# Patient Record
Sex: FEMALE | URBAN - METROPOLITAN AREA
[De-identification: names, ages, dates, MRNs, and addresses within clinical notes are randomized per-mention and may not be internally consistent; named-entity substitution may affect disease eponyms.]

---

## 2022-08-17 ENCOUNTER — TELEPHONE (OUTPATIENT)
Dept: GYNECOLOGIC ONCOLOGY | Facility: CLINIC | Age: 57
End: 2022-08-17

## 2022-08-17 DIAGNOSIS — N84.2 VAGINAL POLYP: Primary | ICD-10-CM

## 2022-08-17 NOTE — TELEPHONE ENCOUNTER
Patient scheduled for 9/13 with Dr January Painting  Patient is aware of date, time, and location of appointment  Patient requests Dr January Painting specifically, as her mother was a former patient  Appointment within 10 days is not necessary

## 2022-09-13 ENCOUNTER — CONSULT (OUTPATIENT)
Dept: GYNECOLOGIC ONCOLOGY | Facility: CLINIC | Age: 57
End: 2022-09-13
Payer: MEDICARE

## 2022-09-13 VITALS
RESPIRATION RATE: 17 BRPM | SYSTOLIC BLOOD PRESSURE: 130 MMHG | DIASTOLIC BLOOD PRESSURE: 86 MMHG | OXYGEN SATURATION: 98 % | TEMPERATURE: 98.2 F | HEART RATE: 97 BPM

## 2022-09-13 DIAGNOSIS — N95.0 PMB (POSTMENOPAUSAL BLEEDING): Primary | ICD-10-CM

## 2022-09-13 DIAGNOSIS — E66.01 MORBID OBESITY WITH BMI OF 45.0-49.9, ADULT (HCC): ICD-10-CM

## 2022-09-13 DIAGNOSIS — K43.9 VENTRAL HERNIA WITHOUT OBSTRUCTION OR GANGRENE: ICD-10-CM

## 2022-09-13 PROBLEM — E03.9 ACQUIRED HYPOTHYROIDISM: Status: ACTIVE | Noted: 2022-09-13

## 2022-09-13 PROBLEM — J95.03 TRACHEAL STENOSIS DUE TO TRACHEOSTOMY (HCC): Status: ACTIVE | Noted: 2022-09-13

## 2022-09-13 PROCEDURE — 58100 BIOPSY OF UTERUS LINING: CPT | Performed by: OBSTETRICS & GYNECOLOGY

## 2022-09-13 PROCEDURE — 88305 TISSUE EXAM BY PATHOLOGIST: CPT | Performed by: STUDENT IN AN ORGANIZED HEALTH CARE EDUCATION/TRAINING PROGRAM

## 2022-09-13 PROCEDURE — 99205 OFFICE O/P NEW HI 60 MIN: CPT | Performed by: OBSTETRICS & GYNECOLOGY

## 2022-09-13 RX ORDER — MEGESTROL ACETATE 40 MG/1
TABLET ORAL
Qty: 120 TABLET | Refills: 3 | Status: SHIPPED | OUTPATIENT
Start: 2022-09-13

## 2022-09-13 RX ORDER — ACETAMINOPHEN 325 MG/1
975 TABLET ORAL ONCE
Status: CANCELLED | OUTPATIENT
Start: 2022-09-13 | End: 2022-09-13

## 2022-09-13 RX ORDER — LIOTHYRONINE SODIUM 5 UG/1
5 TABLET ORAL DAILY
COMMUNITY

## 2022-09-13 RX ORDER — HEPARIN SODIUM 5000 [USP'U]/ML
5000 INJECTION, SOLUTION INTRAVENOUS; SUBCUTANEOUS
Status: CANCELLED | OUTPATIENT
Start: 2022-09-14 | End: 2022-09-15

## 2022-09-13 RX ORDER — GABAPENTIN 100 MG/1
200 CAPSULE ORAL ONCE
Status: CANCELLED | OUTPATIENT
Start: 2022-09-13 | End: 2022-09-13

## 2022-09-13 RX ORDER — LEVOTHYROXINE SODIUM 0.1 MG/1
100 TABLET ORAL DAILY
COMMUNITY

## 2022-09-13 RX ORDER — SODIUM CHLORIDE, SODIUM LACTATE, POTASSIUM CHLORIDE, CALCIUM CHLORIDE 600; 310; 30; 20 MG/100ML; MG/100ML; MG/100ML; MG/100ML
125 INJECTION, SOLUTION INTRAVENOUS CONTINUOUS
Status: CANCELLED | OUTPATIENT
Start: 2022-09-13

## 2022-09-15 NOTE — PROGRESS NOTES
Assessment/Plan:    Problem List Items Addressed This Visit        Other    Morbid obesity with BMI of 45 0-49 9, adult ([facility] [address] ) - Primary    Relevant Medications    Semaglutide-Weight Management (WEGOVY) 0 25 MG/0 5ML    Other Relevant Orders    Tissue Exam    PMB (postmenopausal bleeding)     58-year-old with morbid obesity, BMI least 45 kilograms/meter squared, history of COVID ARDS requiring intubation, tracheostomy, peg tube placement which was complicated by gastric perforation requiring exploratory laparotomy  Midline incision healed by secondary intention  She has a large incisional hernia  She has tracheal scarring and has been recommended for short segment tracheal resection  She has postmenopausal bleeding and an atypical glandular cell Pap smear  She has a history of complex atypical hyperplasia of the endometrium diagnosed in 2016  I reviewed her CT abdomen pelvis results from July, ultrasound results  Her performance status is 2   1  I reviewed the Pap smear, ultrasound data with her  2  Will follow-up results of endometrial biopsy performed today  3  I prescribed JPFHJD for weight loss  We discussed the risks and benefits of Wegovy and she agrees to proceed  4  Megace 80 mg twice daily to palliate her postmenopausal bleeding  She will stop her norethindrone  5  I discussed the risks and benefits of possible robotic assisted total laparoscopic hysterectomy, bilateral salpingo-oophorectomy, possible lymph node dissection, possible exploratory laparotomy and all other indicated procedures  She understands that ventral hernia repair can be performed simultaneously but that mesh choices are limited due to risk of postoperative infection  She understands the risks and benefits of surgery and agrees to proceed as outlined  Consent for surgery was obtained by me in the office  6  I briefly discussed her tracheal scarring with thoracic surgery  She does not have stridor    She can be referred for short segment tracheal resection after this procedure as she would not be able to be intubated for up to 6 months after a tracheal resection  Thank you for the courtesy of this consultation  All questions were answered by the end of the visit  Relevant Medications    megestrol (MEGACE) 40 mg tablet    Other Relevant Orders    Case request operating room: HYSTERECTOMY LAPAROSCOPIC TOTAL ([address]) W/ ROBOTICS, VENTRAL HERNIA REPAIR (Completed)    Type and screen    Comprehensive metabolic panel    CBC and differential    Protime-INR    HEMOGLOBIN A1C W/ EAG ESTIMATION    EKG 12 lead    XR chest pa & lateral    Ventral hernia without obstruction or gangrene    Relevant Orders    Ambulatory referral to General Surgery    Case request operating room: HYSTERECTOMY LAPAROSCOPIC TOTAL ([address]) W/ ROBOTICS, VENTRAL HERNIA REPAIR (Completed)              CHIEF COMPLAINT:  Postmenopausal bleeding, abnormal Pap smear          Patient ID: Herb Delarosa is a 61 y o  female  58-year-old para 0 with morbid obesity referred by Dr Ferny Rust to discuss treatment options for postmenopausal bleeding, atypical glandular cells Pap smear  Her history is notable for severe COVID infection with ARDS requiring intubation, tracheostomy, peg tube placement  Peg tube placement complicated by gastric perforation which then required exploratory laparotomy and washout  Her abdominal incision healed by secondary intention  After removal of the tracheostomy, 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  The scar was partially revised, however, she was recommended for short segment tracheal resection  Family history is notable for endometrial cancer-uterine papillary serous carcinoma-in her mother  Transvaginal and pelvic ultrasound on 6/21/2022 revealed a 10 mm endometrial thickness  The uterus measured 7 2 x 3 6 cm  The ovaries were not seen    She had a CT of the abdomen pelvis in July of 2022 which did not reveal any evidence of lymphadenopathy, ascites, distant disease  She bleeds daily  She had some palliation with norethindrone  Review of Systems   Constitutional: Positive for activity change and fatigue  Negative for unexpected weight change  HENT: Negative  Eyes: Negative  Respiratory: Positive for shortness of breath  Cardiovascular: Negative  Gastrointestinal: Negative for abdominal distention and abdominal pain  Endocrine: Negative  Genitourinary: Positive for vaginal bleeding  Negative for pelvic pain  Musculoskeletal: Negative  Skin: Negative  Allergic/Immunologic: Negative  Neurological: Negative  Hematological: Negative  Psychiatric/Behavioral: The patient is nervous/anxious  Current Outpatient Medications   Medication Sig Dispense Refill    levothyroxine 100 mcg tablet Take 100 mcg by mouth daily      liothyronine (CYTOMEL) 5 mcg tablet Take 5 mcg by mouth daily      megestrol (MEGACE) 40 mg tablet 2 tablets by mouth twice daily 120 tablet 3    metoprolol tartrate (LOPRESSOR) 25 mg tablet Take 25 mg by mouth every 12 (twelve) hours      Semaglutide-Weight Management (WEGOVY) 0 25 MG/0 5ML Inject 0 5 mL (0 25 mg total) under the skin once a week for 28 days 2 mL 0     No current facility-administered medications for this visit         No Known Allergies    Past Medical History:   Diagnosis Date    Acute hypoxemic respiratory failure due to COVID-19 [facility])     Primary hypertension        Past Surgical History:   Procedure Laterality Date    CARPAL TUNNEL RELEASE      EXPLORATORY LAPAROTOMY      PEG TUBE PLACEMENT      REVISION TRACHEOSTOMY SCAR      TRACHEOSTOMY         OB History        0    Para   0    Term   0       0    AB   0    Living   0       SAB   0    IAB   0    Ectopic   0    Multiple   0    Live Births   0                 Family History   Problem Relation Age of Onset    Cancer Mother     Cancer Father        The following portions of the patient's history were reviewed and updated as appropriate: allergies, current medications, past family history, past medical history, past social history, past surgical history and problem list       Objective:    Blood pressure 130/86, pulse 97, temperature 98 2 °F (36 8 °C), temperature source Temporal, resp  rate 17, SpO2 98 %  There is no height or weight on file to calculate BMI  Physical Exam  Vitals reviewed  Exam conducted with a chaperone present  Constitutional:       General: She is not in acute distress  Appearance: Normal appearance  She is well-developed  She is obese  She is not ill-appearing, toxic-appearing or diaphoretic  HENT:      Head: Normocephalic and atraumatic  Mouth/Throat:      Mouth: Mucous membranes are moist    Eyes:      General: No scleral icterus  Right eye: No discharge  Left eye: No discharge  Extraocular Movements: Extraocular movements intact  Conjunctiva/sclera: Conjunctivae normal    Neck:      Thyroid: No thyromegaly  Cardiovascular:      Rate and Rhythm: Normal rate and regular rhythm  Heart sounds: Normal heart sounds  No murmur heard  No friction rub  No gallop  Pulmonary:      Effort: Pulmonary effort is normal  No respiratory distress  Breath sounds: Normal breath sounds  No stridor  Abdominal:      General: There is no distension  Palpations: Abdomen is soft  There is no mass  Tenderness: There is no abdominal tenderness  There is no guarding or rebound  Hernia: A hernia is present  Comments: At least 8 cm hernia present left upper quadrant adjacent to the midline incision   Genitourinary:     Comments: The external female genitalia is normal  The bartholin's, uretheral and skenes glands are normal  The urethral meatus is normal (midline with no lesions)  Anus without fissure or lesion  Speculum exam reveals vagina without lesion or discharge   Cervix is normal appearing and anterior without visible lesions  There was a mass prolapsing through the cervical os  There was a small amount of blood coming from the cervical os as well  Change in position was necessary to adequately visualize the cervix  No significant cystocele or rectocele noted  Bimanual exam notes a uterus with normal contour, mobility  No tenderness  Adnexa without masses or tenderness  Bladder is without fullness, mass or tenderness  Musculoskeletal:         General: No swelling, tenderness, deformity or signs of injury  Cervical back: Normal range of motion and neck supple  Right lower leg: Edema present  Left lower leg: Edema present  Lymphadenopathy:      Cervical: No cervical adenopathy  Skin:     General: Skin is warm and dry  Coloration: Skin is not jaundiced or pale  Findings: No bruising, erythema or rash  Neurological:      General: No focal deficit present  Mental Status: She is alert and oriented to person, place, and time  Cranial Nerves: No cranial nerve deficit  Motor: No weakness  Gait: Gait abnormal    Psychiatric:         Mood and Affect: Mood normal          Behavior: Behavior normal          Thought Content: Thought content normal          Judgment: Judgment normal          Endometrial biopsy    Date/Time: 9/15/2022 8:49 AM  Performed by: Melvina Meredith MD  Authorized by: Melvina Meredith MD   Universal Protocol:  Consent: Verbal consent obtained    Consent given by: patient  Patient understanding: patient states understanding of the procedure being performed  Patient identity confirmed: verbally with patient      Indication:     Indications: Post-menopausal bleeding      Chronicity of post-menopausal bleeding:  Recurrent    Progression of post-menopausal bleeding:  Worsening  Procedure:     Procedure: endometrial biopsy with Pipelle      A bivalve speculum was placed in the vagina: yes      Uterus sounded: yes      Uterus sound depth (cm):  8    Specimen collected: specimen collected and sent to pathology      Patient tolerated procedure well with no complications: yes    Findings:     Uterus size:  6-8 weeks    Cervix comment:  Mass prolapsing through cervix  Comments:     Procedure comments: The cervix was positioned anteriorly and slightly behind the pubic symphysis  Therefore, in order to visualize the cervix appropriately, she was placed in dorsal lithotomy position  This allowed adequate visualization of the cervix  The mass at the cervical os was removed  Endometrial biopsy was subsequently performed  Both specimens were sent together

## 2022-09-15 NOTE — ASSESSMENT & PLAN NOTE
25-year-old with morbid obesity, BMI least 45 kilograms/meter squared, history of COVID ARDS requiring intubation, tracheostomy, peg tube placement which was complicated by gastric perforation requiring exploratory laparotomy  Midline incision healed by secondary intention  She has a large incisional hernia  She has tracheal scarring and has been recommended for short segment tracheal resection  She has postmenopausal bleeding and an atypical glandular cell Pap smear  She has a history of complex atypical hyperplasia of the endometrium diagnosed in 2016  I reviewed her CT abdomen pelvis results from July, ultrasound results  Her performance status is 2   1  I reviewed the Pap smear, ultrasound data with her  2  Will follow-up results of endometrial biopsy performed today  3  I prescribed RUYDOI for weight loss  We discussed the risks and benefits of Wegovy and she agrees to proceed  4  Megace 80 mg twice daily to palliate her postmenopausal bleeding  She will stop her norethindrone  5  I discussed the risks and benefits of possible robotic assisted total laparoscopic hysterectomy, bilateral salpingo-oophorectomy, possible lymph node dissection, possible exploratory laparotomy and all other indicated procedures  She understands that ventral hernia repair can be performed simultaneously but that mesh choices are limited due to risk of postoperative infection  She understands the risks and benefits of surgery and agrees to proceed as outlined  Consent for surgery was obtained by me in the office  6  I briefly discussed her tracheal scarring with thoracic surgery  She does not have stridor  She can be referred for short segment tracheal resection after this procedure as she would not be able to be intubated for up to 6 months after a tracheal resection  Thank you for the courtesy of this consultation  All questions were answered by the end of the visit

## 2022-09-21 ENCOUNTER — DOCUMENTATION (OUTPATIENT)
Dept: GYNECOLOGIC ONCOLOGY | Facility: CLINIC | Age: 57
End: 2022-09-21

## 2022-09-21 NOTE — PROGRESS NOTES
I discussed the pathology from her endometrial biopsy with her  There was no evidence of hyperplasia or malignancy  There was evidence of endometrial polyp  I therefore discussed changing her procedure from hysterectomy and bilateral salpingo-oophorectomy to a dilation and curettage with hysteroscopy given the minimal risk of D&C verses hysterectomy  She will then likely plan to have her short segment tracheal resection, continue weight loss efforts and have hernia repair after she has recovered from the short segment tracheal resection  She understands the risks and benefits of a D&C hysteroscopy and agrees to proceed as outlined  Will obtain written consent day of surgery  She will cancel her appointment with Dr You Rome for now

## 2022-09-27 ENCOUNTER — TELEPHONE (OUTPATIENT)
Dept: SURGICAL ONCOLOGY | Facility: CLINIC | Age: 57
End: 2022-09-27

## 2022-09-27 NOTE — TELEPHONE ENCOUNTER
Patient called in to confirm that we will call her to schedule her D&C with Dr Alanis  Confirmed with Haylee that Sherry Espinal will call her to schedule  She was agreeable and appreciative and will wait for call

## 2022-09-29 DIAGNOSIS — E66.01 MORBID OBESITY WITH BMI OF 45.0-49.9, ADULT (HCC): ICD-10-CM

## 2022-10-07 ENCOUNTER — ANESTHESIA EVENT (OUTPATIENT)
Dept: PERIOP | Facility: HOSPITAL | Age: 57
End: 2022-10-07
Payer: MEDICARE

## 2022-10-07 RX ORDER — ASPIRIN 81 MG/1
81 TABLET ORAL DAILY
COMMUNITY

## 2022-10-07 NOTE — PRE-PROCEDURE INSTRUCTIONS
Pre-Surgery Instructions:   Medication Instructions   • aspirin (ECOTRIN LOW STRENGTH) 81 mg EC tablet Stop taking 7 days prior to surgery  • levothyroxine 100 mcg tablet Take day of surgery  • liothyronine (CYTOMEL) 5 mcg tablet Take day of surgery  • megestrol (MEGACE) 40 mg tablet Hold day of surgery  • metoprolol tartrate (LOPRESSOR) 25 mg tablet Take day of surgery  You will receive a phone call from hospital for arrival time  Please call surgeons office if any changes in your condition  Wear easy on/off clothing; consider type of surgery;  Valuables, jewelry, piercing's please keep at home  **COVID-19  education/surgical guidelines  Updated covid    Visitation policy  Please: No contact lenses or eye make up, artificial eyelashes    Please secure transportation     Follow pre surgery showering or cleaning instructions as  Reviewed by nurse or surgeons office      Questions answered and concerns addressed

## 2022-10-12 LAB
ABO GROUP BLD: NORMAL
EST. AVERAGE GLUCOSE BLD GHB EST-MCNC: 128 MG/DL
HBA1C MFR BLD: 6.1 % (ref 4.8–5.6)
INR PPP: 1 (ref 0.9–1.2)
PROTHROMBIN TIME: 10.4 SEC (ref 9.1–12)
RH BLD: POSITIVE

## 2022-10-13 ENCOUNTER — TELEPHONE (OUTPATIENT)
Dept: SURGICAL ONCOLOGY | Facility: CLINIC | Age: 57
End: 2022-10-13

## 2022-10-13 NOTE — TELEPHONE ENCOUNTER
The megace is palliative, so she can stop it after the [facility]  and see how she does  Would restart with worsening bleeding   Thanks

## 2022-10-17 ENCOUNTER — HOSPITAL ENCOUNTER (OUTPATIENT)
Facility: HOSPITAL | Age: 57
Setting detail: OUTPATIENT SURGERY
Discharge: HOME | End: 2022-10-17
Attending: OBSTETRICS & GYNECOLOGY | Admitting: OBSTETRICS & GYNECOLOGY
Payer: MEDICARE

## 2022-10-17 ENCOUNTER — ANESTHESIA (OUTPATIENT)
Dept: PERIOP | Facility: HOSPITAL | Age: 57
End: 2022-10-17
Payer: MEDICARE

## 2022-10-17 VITALS
BODY MASS INDEX: 46.93 KG/M2 | TEMPERATURE: 99.3 F | DIASTOLIC BLOOD PRESSURE: 96 MMHG | WEIGHT: 255 LBS | OXYGEN SATURATION: 96 % | HEIGHT: 62 IN | HEART RATE: 68 BPM | RESPIRATION RATE: 20 BRPM | SYSTOLIC BLOOD PRESSURE: 201 MMHG

## 2022-10-17 DIAGNOSIS — N93.9 ABNORMAL UTERINE BLEEDING (AUB): ICD-10-CM

## 2022-10-17 DIAGNOSIS — Z98.890 S/P D&C (STATUS POST DILATION AND CURETTAGE): Primary | ICD-10-CM

## 2022-10-17 PROCEDURE — NC001 PR NO CHARGE: Performed by: OBSTETRICS & GYNECOLOGY

## 2022-10-17 RX ORDER — KETAMINE HYDROCHLORIDE 50 MG/ML
INJECTION, SOLUTION, CONCENTRATE INTRAMUSCULAR; INTRAVENOUS AS NEEDED
Status: DISCONTINUED | OUTPATIENT
Start: 2022-10-17 | End: 2022-10-17

## 2022-10-17 RX ORDER — ACETAMINOPHEN 325 MG/1
975 TABLET ORAL EVERY 6 HOURS PRN
Status: DISCONTINUED | OUTPATIENT
Start: 2022-10-17 | End: 2022-10-17 | Stop reason: HOSPADM

## 2022-10-17 RX ORDER — GLYCOPYRROLATE 0.2 MG/ML
INJECTION INTRAMUSCULAR; INTRAVENOUS AS NEEDED
Status: DISCONTINUED | OUTPATIENT
Start: 2022-10-17 | End: 2022-10-17

## 2022-10-17 RX ORDER — ONDANSETRON 2 MG/ML
4 INJECTION INTRAMUSCULAR; INTRAVENOUS ONCE AS NEEDED
Status: DISCONTINUED | OUTPATIENT
Start: 2022-10-17 | End: 2022-10-17 | Stop reason: HOSPADM

## 2022-10-17 RX ORDER — IBUPROFEN 400 MG/1
600 TABLET ORAL EVERY 6 HOURS PRN
Status: DISCONTINUED | OUTPATIENT
Start: 2022-10-17 | End: 2022-10-17 | Stop reason: HOSPADM

## 2022-10-17 RX ORDER — SODIUM CHLORIDE, SODIUM LACTATE, POTASSIUM CHLORIDE, CALCIUM CHLORIDE 600; 310; 30; 20 MG/100ML; MG/100ML; MG/100ML; MG/100ML
125 INJECTION, SOLUTION INTRAVENOUS CONTINUOUS
Status: DISCONTINUED | OUTPATIENT
Start: 2022-10-17 | End: 2022-10-17 | Stop reason: HOSPADM

## 2022-10-17 RX ORDER — HEPARIN SODIUM 5000 [USP'U]/ML
5000 INJECTION, SOLUTION INTRAVENOUS; SUBCUTANEOUS
Status: DISCONTINUED | OUTPATIENT
Start: 2022-10-18 | End: 2022-10-17 | Stop reason: HOSPADM

## 2022-10-17 RX ORDER — ONDANSETRON 2 MG/ML
INJECTION INTRAMUSCULAR; INTRAVENOUS AS NEEDED
Status: DISCONTINUED | OUTPATIENT
Start: 2022-10-17 | End: 2022-10-17

## 2022-10-17 RX ORDER — SODIUM CHLORIDE, SODIUM LACTATE, POTASSIUM CHLORIDE, CALCIUM CHLORIDE 600; 310; 30; 20 MG/100ML; MG/100ML; MG/100ML; MG/100ML
20 INJECTION, SOLUTION INTRAVENOUS CONTINUOUS
Status: DISCONTINUED | OUTPATIENT
Start: 2022-10-17 | End: 2022-10-17 | Stop reason: HOSPADM

## 2022-10-17 RX ORDER — ONDANSETRON 2 MG/ML
4 INJECTION INTRAMUSCULAR; INTRAVENOUS EVERY 6 HOURS PRN
Status: DISCONTINUED | OUTPATIENT
Start: 2022-10-17 | End: 2022-10-17 | Stop reason: HOSPADM

## 2022-10-17 RX ORDER — PROPOFOL 10 MG/ML
INJECTION, EMULSION INTRAVENOUS CONTINUOUS PRN
Status: DISCONTINUED | OUTPATIENT
Start: 2022-10-17 | End: 2022-10-17

## 2022-10-17 RX ORDER — MIDAZOLAM HYDROCHLORIDE 2 MG/2ML
INJECTION, SOLUTION INTRAMUSCULAR; INTRAVENOUS AS NEEDED
Status: DISCONTINUED | OUTPATIENT
Start: 2022-10-17 | End: 2022-10-17

## 2022-10-17 RX ORDER — PROPOFOL 10 MG/ML
INJECTION, EMULSION INTRAVENOUS AS NEEDED
Status: DISCONTINUED | OUTPATIENT
Start: 2022-10-17 | End: 2022-10-17

## 2022-10-17 RX ORDER — ACETAMINOPHEN 325 MG/1
975 TABLET ORAL ONCE
Status: DISCONTINUED | OUTPATIENT
Start: 2022-10-17 | End: 2022-10-17 | Stop reason: HOSPADM

## 2022-10-17 RX ORDER — IBUPROFEN 600 MG/1
600 TABLET ORAL EVERY 6 HOURS PRN
Qty: 30 TABLET | Refills: 0 | Status: SHIPPED | OUTPATIENT
Start: 2022-10-17 | End: 2022-12-13

## 2022-10-17 RX ORDER — FENTANYL CITRATE 50 UG/ML
INJECTION, SOLUTION INTRAMUSCULAR; INTRAVENOUS AS NEEDED
Status: DISCONTINUED | OUTPATIENT
Start: 2022-10-17 | End: 2022-10-17

## 2022-10-17 RX ORDER — FENTANYL CITRATE/PF 50 MCG/ML
25 SYRINGE (ML) INJECTION
Status: DISCONTINUED | OUTPATIENT
Start: 2022-10-17 | End: 2022-10-17 | Stop reason: HOSPADM

## 2022-10-17 RX ORDER — GABAPENTIN 100 MG/1
200 CAPSULE ORAL ONCE
Status: DISCONTINUED | OUTPATIENT
Start: 2022-10-17 | End: 2022-10-17 | Stop reason: HOSPADM

## 2022-10-17 RX ORDER — ACETAMINOPHEN 500 MG
1000 TABLET ORAL EVERY 6 HOURS PRN
Qty: 30 TABLET | Refills: 0 | Status: SHIPPED | OUTPATIENT
Start: 2022-10-17

## 2022-10-17 RX ADMIN — ONDANSETRON 4 MG: 2 INJECTION INTRAMUSCULAR; INTRAVENOUS at 11:40

## 2022-10-17 RX ADMIN — MIDAZOLAM 2 MG: 1 INJECTION INTRAMUSCULAR; INTRAVENOUS at 11:19

## 2022-10-17 RX ADMIN — FENTANYL CITRATE 25 MCG: 50 INJECTION INTRAMUSCULAR; INTRAVENOUS at 11:28

## 2022-10-17 RX ADMIN — FENTANYL CITRATE 25 MCG: 50 INJECTION INTRAMUSCULAR; INTRAVENOUS at 11:36

## 2022-10-17 RX ADMIN — IBUPROFEN 600 MG: 400 TABLET, FILM COATED ORAL at 12:28

## 2022-10-17 RX ADMIN — PROPOFOL 50 MG: 10 INJECTION, EMULSION INTRAVENOUS at 11:28

## 2022-10-17 RX ADMIN — SODIUM CHLORIDE, SODIUM LACTATE, POTASSIUM CHLORIDE, AND CALCIUM CHLORIDE 125 ML/HR: .6; .31; .03; .02 INJECTION, SOLUTION INTRAVENOUS at 10:19

## 2022-10-17 RX ADMIN — PROPOFOL 50 MCG/KG/MIN: 10 INJECTION, EMULSION INTRAVENOUS at 11:28

## 2022-10-17 RX ADMIN — KETAMINE HYDROCHLORIDE 10 MG: 50 INJECTION, SOLUTION INTRAMUSCULAR; INTRAVENOUS at 11:45

## 2022-10-17 RX ADMIN — KETAMINE HYDROCHLORIDE 10 MG: 50 INJECTION, SOLUTION INTRAMUSCULAR; INTRAVENOUS at 11:28

## 2022-10-17 RX ADMIN — GLYCOPYRROLATE 0.1 MG: 0.2 INJECTION, SOLUTION INTRAMUSCULAR; INTRAVENOUS at 11:26

## 2022-10-17 NOTE — OP NOTE
OPERATIVE REPORT  PATIENT NAME: Lucia Carlisle    :  1965  MRN: [id number]  Pt Location:  OR ROOM 14    SURGERY DATE: 10/17/2022    Surgeon(s) and Role:     * Jaye Guy MD - Primary     * Shellie Kennedy MD - Assisting    Preop Diagnosis:  Abnormal uterine bleeding (AUB) [N93 9]    Post-Op Diagnosis Codes:     * Abnormal uterine bleeding (AUB) [N93 9]    Procedure(s) (LRB):  DILATATION AND CURETTAGE (D&C) WITH HYSTEROSCOPY (N/A)    Specimen(s):  ID Type Source Tests Collected by Time Destination   1 : [facility] Tissue Cervix, Endocervical TISSUE EXAM Jaye Guy MD 10/17/2022 1144    2 : [facility] Tissue Endometrium TISSUE EXAM Jaye Guy MD 10/17/2022 1153    3 : [facility] symphion  Tissue Endometrium TISSUE EXAM Jaye Guy MD 10/17/2022 1200        Estimated Blood Loss:   Minimal    Drains:  * No LDAs found *    Anesthesia Type:   General/LMA    Operative Indications:  Abnormal uterine bleeding (AUB) [N93 9]  Postmenopausal bleeding    Operative Findings:  Normal appearing external female genitalia  Grossly benign, proliferative endometrial tissue on hysteroscopy  Endometrial curettage completed with samples collected and sent to pathology    Complications:   None apparent    Procedure and Technique:  Patient was taken to the operating room  General LMA anesthesia (LMA) was administered and the patient was positioned on the OR table in the dorsal lithotomy position  All pressure points were padded and a yojana hugger was placed to maintain control of core body temperature  A bimanual exam was performed and the uterus was noted to be anteverted, normal in size and consistency with no palpable adnexal masses or fullness  The patient was prepped and draped in the usual sterile fashion  A time out was performed to confirm correct patient and correct procedure   A Jeana speculum was inserted into the vagina and a [place] retractor was used to visualize the anterior lip of the cervix, which was then grasped with a single toothed tenaculum  The uterus was sounded to 8cm  The cervix was serially dilated using Locke dilators for introduction of the hysteroscope  Symphion system hysteroscopewas introduced under direct visualization using normal saline solution as the distention media  Hysteroscope was advanced to the uterine fundus and the entire uterine cavity was inspected in a systematic manner  There was noted to be grossly benign, proliferative appearing endometrial tissue  No apparent polyps, masses, or lesions were appreciated  Bilateral ostia were visualized  Hysteroscope was withdrawn and sharp curetting was performed, starting at the 12'oclock position and rotating a total of 360 degrees to cover all surfaces  Endometrial tissue was obtained and sent for pathology  The hysteroscope was then re-introduced under direct visualization, again using normal saline solution as the distention media  Entire uterine cavity was inspected in a systematic manner  Adequate curetting was confirmed and hysteroscope was withdrawn  The single toothed tenaculum was removed from the anterior lip of the cervix  Good hemostasis was confirmed at the tenaculum sites  All instrumentation was then removed from the vagina  Fluid deficit was noted to be 0cc  At the conclusion of the procedure, all needle, sponge, and instrument counts were noted to be correct x2  Dr Chris Andrews was present and participated in all key portions of the case               Patient Disposition:  PACU         SIGNATURE: Kourtney Chu MD  DATE: October 17, 2022  TIME: 12:10 PM

## 2022-10-17 NOTE — ANESTHESIA PREPROCEDURE EVALUATION
Procedure:  DILATATION AND CURETTAGE (D&C) WITH HYSTEROSCOPY (N/A Uterus)    Relevant Problems   ENDO   (+) Acquired hypothyroidism        Physical Exam    Airway    Mallampati score: I  TM Distance: <3 FB  Neck ROM: limited     Dental   No notable dental hx     Cardiovascular  Cardiovascular exam normal    Pulmonary  Pulmonary exam normal     Other Findings        Anesthesia Plan  ASA Score- 4     Anesthesia Type- IV sedation with anesthesia with ASA Monitors  Additional Monitors:   Airway Plan:     Comment: S/P multiple complications from COVID 19- prolonged intubation req TRACH, now with residual short segment stenosis in need of tracheal reconstruction; need for Gtube during which liver was perforated, resulting in cholangitis, sepsis, multiorgan failure req dialysis  Now resolved, but has residual symptomatic ventral hernias due for repair  Patient states she can breath well IF sitting up, no oxygen requirement or nebulizers or CPAP needed  Pulmonary consult pending hernia repair as per patient  Patient understands today's procedure will be done under MAC, and we will avoid instrumenting her airway          Plan Factors-Exercise tolerance (METS): <4 METS  Chart reviewed  Imaging results reviewed  Existing labs reviewed  Patient summary reviewed  Patient is not a current smoker  Obstructive sleep apnea risk education given perioperatively  Induction- intravenous  Postoperative Plan-     Informed Consent- Anesthetic plan and risks discussed with patient  I personally reviewed this patient with the CRNA  Discussed and agreed on the Anesthesia Plan with the CRNA  Elizabeth Morris

## 2022-10-17 NOTE — ANESTHESIA POSTPROCEDURE EVALUATION
Post-Op Assessment Note    CV Status:  Stable  Pain Score: 0    Pain management: adequate     Mental Status:  Alert and awake   Hydration Status:  Euvolemic   PONV Controlled:  Controlled   Airway Patency:  Patent      Post Op Vitals Reviewed: Yes      Staff: CRNA         No complications documented      BP  160/75   Temp   98 4   Pulse  88   Resp   22   SpO2   98

## 2022-10-17 NOTE — ASSESSMENT & PLAN NOTE
62yo with PMB and negative EMB presenting for hysteroscopic D&C  Follow-up results for continued assessment  Patient understands risks for malignancy given bleeding with increased EMS thickness  To OR for D&C hysteroscopy
yes

## 2022-10-17 NOTE — H&P
H&P Exam - Gynecology Oncology  Carlita Phelan 61 y o  female MRN: [id number]  Unit/Bed#: OR POOL Encounter: [id number]        Assessment/Plan   * PMB (postmenopausal bleeding)  Assessment & Plan  58yo with PMB and negative EMB presenting for hysteroscopic D&C  Follow-up results for continued assessment  Patient understands risks for malignancy given bleeding with increased EMS thickness  To OR for D&C hysteroscopy         History of Present Illness     HPI:  Carlita Phelan is a 61 y o  female who presents with PMB and negative EMB  Patient was previously consented for robotic assistant total laparoscopic hysterectomy  With negative EMB results and significant compatibilities, combined decision with patient was made to transition to hysteroscopic D&C for assessment of endometrium  Last occurrence of bleeding yesterday with vaginal spotting  Patient denies N/V, chest pain, shortness of breath, or other acute concerns  Oncology History    No history exists  Review of Systems   Constitutional: Negative for chills and fever  HENT: Negative for congestion, sinus pressure and sinus pain  Eyes: Negative for visual disturbance  Respiratory: Negative for cough, shortness of breath and wheezing  Cardiovascular: Negative for chest pain, palpitations and leg swelling  Gastrointestinal: Negative for abdominal pain, constipation, diarrhea, nausea and vomiting  Genitourinary: Negative for dysuria, vaginal bleeding and vaginal discharge  Skin: Negative for color change, pallor and rash  Neurological: Negative for weakness and light-headedness  Psychiatric/Behavioral: Negative for agitation and behavioral problems  Historical Information   Past Medical History:   Diagnosis Date   • Acute hypoxemic respiratory failure due to COVID-19 [facility])     trached-trachael damage-resection  damage to liver feeding tube   renal failure-dialysis   • History of renal failure     s/p sepsis and severe covid, was on dialysis   • History of transfusion    • Primary hypertension      Past Surgical History:   Procedure Laterality Date   • CARPAL TUNNEL RELEASE     • EXPLORATORY LAPAROTOMY     • PEG TUBE PLACEMENT      and removed   • REVISION TRACHEOSTOMY SCAR     • TRACHEOSTOMY     • TUMOR REMOVAL Left     upper arm at age 11, benign     OB History    Para Term  AB Living   0 0 0 0 0 0   SAB IAB Ectopic Multiple Live Births   0 0 0 0 0     Family History   Problem Relation Age of Onset   • Cancer Mother    • Cancer Father      Social History   Social History     Substance and Sexual Activity   Alcohol Use Yes    Comment: SOCIAL     Social History     Substance and Sexual Activity   Drug Use Never     Social History     Tobacco Use   Smoking Status Former Smoker   • Packs/day: 0 50   • Years: 10 00   • Pack years: 5 00   • Types: Cigarettes   • Quit date:    • Years since quittin 8   Smokeless Tobacco Never Used       Meds/Allergies   Medications Prior to Admission   Medication   • aspirin (ECOTRIN LOW STRENGTH) 81 mg EC tablet   • levothyroxine 100 mcg tablet   • liothyronine (CYTOMEL) 5 mcg tablet   • megestrol (MEGACE) 40 mg tablet   • metoprolol tartrate (LOPRESSOR) 25 mg tablet     No Known Allergies    Objective     Ht 5' 2" (1 575 m)   Wt 116 kg (255 lb)   BMI 46 64 kg/m²     No intake/output data recorded  No intake/output data recorded  No results found for: WBC, HGB, HCT, MCV, PLT    No results found for: GLUCOSE, CALCIUM, NA, K, CO2, CL, BUN, CREATININE    Physical Exam  Vitals and nursing note reviewed  Exam conducted with a chaperone present  Constitutional:       General: She is not in acute distress  Appearance: She is obese  HENT:      Head: Normocephalic  Right Ear: External ear normal       Left Ear: External ear normal    Eyes:      General: No scleral icterus  Right eye: No discharge  Left eye: No discharge        Conjunctiva/sclera: Conjunctivae normal  Cardiovascular:      Rate and Rhythm: Normal rate and regular rhythm  Pulses: Normal pulses  Heart sounds: Normal heart sounds  Pulmonary:      Effort: Pulmonary effort is normal  No respiratory distress  Breath sounds: Normal breath sounds  Abdominal:      General: There is no distension  Palpations: Abdomen is soft  Tenderness: There is no abdominal tenderness  There is no guarding  Hernia: A hernia is present  Genitourinary:     Comments: deferred  Musculoskeletal:         General: No swelling or tenderness  Normal range of motion  Cervical back: Normal range of motion  Right lower leg: No edema  Left lower leg: No edema  Skin:     General: Skin is warm and dry  Capillary Refill: Capillary refill takes less than 2 seconds  Neurological:      Mental Status: She is alert and oriented to person, place, and time  Mental status is at baseline  Psychiatric:         Mood and Affect: Mood normal          Behavior: Behavior normal          Imaging: I have personally reviewed pertinent reports  EKG, Pathology, and Other Studies: I have personally reviewed pertinent reports            Eli Somers  10/17/2022  9:11 AM

## 2022-10-26 ENCOUNTER — TELEPHONE (OUTPATIENT)
Dept: SURGICAL ONCOLOGY | Facility: CLINIC | Age: 57
End: 2022-10-26

## 2022-10-26 NOTE — TELEPHONE ENCOUNTER
Patient called in to see if she needs to schedule follow up appointment with Dr Alanis since she had her D&C done 10/17  Spoke with Don Hernandez, who will call her once she talks to [employer]  Informed patient and she was agreeable

## 2022-10-31 ENCOUNTER — TELEPHONE (OUTPATIENT)
Dept: GYNECOLOGIC ONCOLOGY | Facility: CLINIC | Age: 57
End: 2022-10-31

## 2022-10-31 NOTE — TELEPHONE ENCOUNTER
Patient called in requesting an update on her tissue exam results  As of right now, the results are still in process, and not released  Patient states that in her discharge papers from 10/17/22, to make a f/u appt with Dr Carolyn Caceres  Patient would like to know if it is possible to do a virtual/ phone call for [place] appt [place]   Patient can be contacted back @[phone]

## 2022-11-01 ENCOUNTER — TELEPHONE (OUTPATIENT)
Dept: SURGICAL ONCOLOGY | Facility: CLINIC | Age: 57
End: 2022-11-01

## 2022-11-02 ENCOUNTER — DOCUMENTATION (OUTPATIENT)
Dept: GYNECOLOGIC ONCOLOGY | Facility: CLINIC | Age: 57
End: 2022-11-02

## 2022-11-02 DIAGNOSIS — N71.1 CHRONIC ENDOMETRITIS: Primary | ICD-10-CM

## 2022-11-02 RX ORDER — DOXYCYCLINE 100 MG/1
100 TABLET ORAL 2 TIMES DAILY
Qty: 14 TABLET | Refills: 0 | Status: SHIPPED | OUTPATIENT
Start: 2022-11-02 | End: 2022-11-09

## 2022-11-02 NOTE — PROGRESS NOTES
We discussed her pathology report in detail  I prescribed doxycycline 100 mg b i d  For 7 days for chronic endometritis  She has not had bleeding since the D&C  We will therefore reduce dose of megace to 40 mg daily  She has hysterectomy planned in early December along with complex hernia repair

## 2022-12-01 ENCOUNTER — TELEPHONE (OUTPATIENT)
Dept: GYNECOLOGIC ONCOLOGY | Facility: CLINIC | Age: 57
End: 2022-12-01

## 2022-12-01 DIAGNOSIS — K43.9 VENTRAL HERNIA WITHOUT OBSTRUCTION OR GANGRENE: Primary | ICD-10-CM

## 2022-12-01 NOTE — TELEPHONE ENCOUNTER
Patient called into the office this morning in regards to the hysterectomy along with the abdominal surgery that she is to have with [facility] and [facility]  Patient states that the hospital called her this morning stating that the hospital will not be following through with the hysterectomy as planned  Patient would like the office to call her back for the next step   Patient phone number is [phone]

## 2022-12-01 NOTE — TELEPHONE ENCOUNTER
Return call placed to patient  Discussed that hernia repair/hysterectomy should be completed at the same time  Offered to pursue surgery with [facility]  She will discuss with her team in [place] and call back with any additional needs

## 2022-12-13 ENCOUNTER — OFFICE VISIT (OUTPATIENT)
Dept: GYNECOLOGIC ONCOLOGY | Facility: CLINIC | Age: 57
End: 2022-12-13

## 2022-12-13 ENCOUNTER — CONSULT (OUTPATIENT)
Dept: SURGERY | Facility: CLINIC | Age: 57
End: 2022-12-13

## 2022-12-13 VITALS
HEART RATE: 90 BPM | DIASTOLIC BLOOD PRESSURE: 100 MMHG | OXYGEN SATURATION: 95 % | SYSTOLIC BLOOD PRESSURE: 150 MMHG | HEIGHT: 62 IN | TEMPERATURE: 97.4 F | BODY MASS INDEX: 53.92 KG/M2 | WEIGHT: 293 LBS

## 2022-12-13 VITALS
HEIGHT: 62 IN | OXYGEN SATURATION: 97 % | TEMPERATURE: 97.7 F | BODY MASS INDEX: 46.93 KG/M2 | RESPIRATION RATE: 16 BRPM | WEIGHT: 255 LBS

## 2022-12-13 DIAGNOSIS — N95.0 PMB (POSTMENOPAUSAL BLEEDING): Primary | ICD-10-CM

## 2022-12-13 DIAGNOSIS — E66.01 MORBID OBESITY WITH BMI OF 45.0-49.9, ADULT (HCC): ICD-10-CM

## 2022-12-13 DIAGNOSIS — K43.9 VENTRAL HERNIA WITHOUT OBSTRUCTION OR GANGRENE: ICD-10-CM

## 2022-12-13 NOTE — PROGRESS NOTES
Office Visit - General Surgery  Paty You MRN: [id number]  Encounter: [id number]    Assessment and Plan  Problem List Items Addressed This Visit        Other    Ventral hernia without obstruction or gangrene     I discussed with her what a hernia repair would entail  She has a very wide scar she would prefer that be excised  I told her it be reasonable to excise the scar and would need to take her bellybutton as well  She understands and is agreeable to that  I explained doing a open hernia repair with component separation and the use of mesh  We discussed the risks, benefits, alternatives, and what to expect postoperatively  She understands and is agreeable to go ahead  Plan: Open incisional hernia repair with probable component separation and mesh placement  Chief Complaint:  Paty You is a 61 y o  female who presents for Hernia (Consult ventral hernia )    Subjective  62-year-old female with here to discuss hernia surgery  She is due to have a hysterectomy by Dr Renetta Damon and has developed a previous incisional hernia  She notices that hernia is gotten somewhat larger  Little bit of discomfort  No change in bowel or bladder habits  Past Medical History:   Diagnosis Date   • Acute hypoxemic respiratory failure due to COVID-19 [facility])     trached-trachael damage-resection  damage to liver feeding tube   renal failure-dialysis   • History of renal failure     s/p sepsis and severe covid, was on dialysis   • History of transfusion    • Primary hypertension        Past Surgical History:   Procedure Laterality Date   • CARPAL TUNNEL RELEASE     • EXPLORATORY LAPAROTOMY     • PEG TUBE PLACEMENT      and removed   • IN HYSTEROSCOPY,W/ENDO BX N/A 10/17/2022    Procedure: DILATATION AND CURETTAGE (D&C) WITH HYSTEROSCOPY;  Surgeon: Darlene Gamble MD;  [facility];  Service: Gynecology Oncology   • REVISION TRACHEOSTOMY SCAR     • TRACHEOSTOMY     • TUMOR REMOVAL Left upper arm at age 89 [address], benign       Family History   Problem Relation Age of Onset   • Cancer Mother    • Cancer Father        Social History     Tobacco Use   • Smoking status: Former     Packs/day: 0 50     Years: 10 00     Pack years: 5 00     Types: Cigarettes     Quit date: 80     Years since quittin 9   • Smokeless tobacco: Never   Vaping Use   • Vaping Use: Never used   Substance Use Topics   • Alcohol use: Yes     Comment: SOCIAL   • Drug use: Never        Medications  Current Outpatient Medications on File Prior to Visit   Medication Sig Dispense Refill   • aspirin (ECOTRIN LOW STRENGTH) 81 mg EC tablet Take 81 mg by mouth daily     • levothyroxine 100 mcg tablet Take 100 mcg by mouth daily     • liothyronine (CYTOMEL) 5 mcg tablet Take 5 mcg by mouth daily     • megestrol (MEGACE) 40 mg tablet 2 tablets by mouth twice daily 120 tablet 3   • metoprolol tartrate (LOPRESSOR) 25 mg tablet Take 25 mg by mouth every 12 (twelve) hours     • acetaminophen (TYLENOL) 500 mg tablet Take 2 tablets (1,000 mg total) by mouth every 6 (six) hours as needed for mild pain 30 tablet 0   • [DISCONTINUED] ibuprofen (MOTRIN) 600 mg tablet Take 1 tablet (600 mg total) by mouth every 6 (six) hours as needed for mild pain (cramping) 30 tablet 0     No current facility-administered medications on file prior to visit  Allergies  No Known Allergies    Review of Systems   Constitutional: Negative for chills and fever  HENT: Negative for ear pain and sore throat  Eyes: Negative for pain and visual disturbance  Respiratory: Positive for shortness of breath, wheezing and stridor  Negative for cough  Cardiovascular: Negative for chest pain and palpitations  Gastrointestinal: Negative for abdominal pain and vomiting  Genitourinary: Negative for dysuria and hematuria  Musculoskeletal: Negative for arthralgias and back pain  Skin: Negative for color change and rash  Neurological: Positive for weakness   Negative for seizures and syncope  All other systems reviewed and are negative  Objective  Vitals:    12/13/22 1027   Resp: 16   Temp: 97 7 °F (36 5 °C)   SpO2: 97%       Physical Exam  Vitals and nursing note reviewed  Constitutional:       General: She is not in acute distress  Appearance: She is well-developed  She is obese  She is not diaphoretic  HENT:      Head: Normocephalic and atraumatic  Right Ear: External ear normal       Left Ear: External ear normal    Eyes:      General: No scleral icterus  Conjunctiva/sclera: Conjunctivae normal    Neck:      Thyroid: No thyromegaly  Trachea: No tracheal deviation  Cardiovascular:      Rate and Rhythm: Normal rate and regular rhythm  Heart sounds: Normal heart sounds  No murmur heard  No friction rub  Pulmonary:      Effort: Pulmonary effort is normal  No respiratory distress  Breath sounds: Normal breath sounds  No wheezing or rales  Abdominal:      General: There is no distension  Palpations: Abdomen is soft  There is no mass  Tenderness: There is no abdominal tenderness  There is no guarding or rebound  Comments: Long wide midline incision with bulging in mid portion on standing   Musculoskeletal:         General: Normal range of motion  Cervical back: Neck supple  Lymphadenopathy:      Cervical: No cervical adenopathy  Skin:     General: Skin is warm and dry  Neurological:      Mental Status: She is alert and oriented to person, place, and time  Psychiatric:         Behavior: Behavior normal          Thought Content:  Thought content normal          Judgment: Judgment normal

## 2022-12-13 NOTE — LETTER
December 13, 2022     Leslie Morales [address] [address]    Patient: Joni Gilbert   YOB: 1965   Date of Visit: 12/13/2022       Dear Dr Fatima Nones: Thank you for referring Joni Gilbert to me for evaluation  Below are my notes for this consultation  If you have questions, please do not hesitate to call me  I look forward to following your patient along with you  Sincerely,        Felice Diego MD        CC: No Recipients  Felice Diego MD  12/13/2022 11:42 AM  Sign when Signing Visit  Assessment/Plan:    Problem List Items Addressed This Visit        Other    Morbid obesity with BMI of 45 0-49 9, adult ([facility] [address] )    PMB (postmenopausal bleeding) - Primary     41-year-old with morbid obesity, BMI 54 kilograms/meter squared, history of COVID ARDS requiring intubation, tracheostomy, peg tube placement which was complicated by gastric perforation requiring exploratory laparotomy  Midline incision healed by secondary intention  She has a large incisional hernia  She has tracheal scarring and has been recommended for short segment tracheal resection  She has postmenopausal bleeding and an atypical glandular cell Pap smear  She has a history of complex atypical hyperplasia of the endometrium diagnosed in 2016  She has seen general surgery and has consented for ventral hernia repair  Postmenopausal bleeding is controlled with Megace  Her performance status is 0   1   I discussed the risks and benefits of total abdominal hysterectomy and bilateral salpingo-oophorectomy to be performed simultaneously with exploratory laparotomy and ventral hernia repair  She understands the risks and benefits of the operation and agrees to proceed as outlined  Consent for surgery was obtained by me in the office  2   Preoperative bowel preparation  3  She will continue palliative Megace therapy until surgery    She understands that she will not require hormone therapy postoperatively  4   Referral to the surgical optimization Center preoperatively due to complex upper airway from prior tracheostomy with scarring  After this operation, she will go for consultation for short segment tracheal resection  Relevant Orders    Ambulatory Referral to Surgical Optimization    Ventral hernia without obstruction or gangrene           CHIEF COMPLAINT: Treatment discussion        Patient ID: Clorinda Kehr is a 61 y o  female  Who returns for treatment discussion  She was initially scheduled to have ventral hernia repair and hysterectomy performed at another institution, however, it could not be performed as there were no gynecology services available at that hospital   She has seen Dr Iliana Blevins here and has consented for ventral hernia repair with scar revision  She is here to discuss the risks and benefits of hysterectomy and bilateral salpingo-oophorectomy to treat her postmenopausal bleeding  Bleeding has been palliated with Megace therapy  D&C in October 2022 revealed chronic endometritis without evidence of hyperplasia or malignancy  No other interval change in medications or medical history since her last visit to the office  Review of Systems   Constitutional: Negative for activity change and unexpected weight change  HENT: Negative  Eyes: Negative  Respiratory: Positive for shortness of breath  Shortness of breath with exertion   Cardiovascular: Negative  Gastrointestinal: Negative for abdominal distention and abdominal pain  Endocrine: Negative  Genitourinary: Negative for pelvic pain and vaginal bleeding  Musculoskeletal: Positive for arthralgias and gait problem  Skin: Negative  Allergic/Immunologic: Negative  Hematological: Negative  Psychiatric/Behavioral: Negative          Current Outpatient Medications   Medication Sig Dispense Refill   • aspirin (ECOTRIN LOW STRENGTH) 81 mg EC tablet Take 81 mg by mouth daily     • levothyroxine 100 mcg tablet Take 100 mcg by mouth daily     • liothyronine (CYTOMEL) 5 mcg tablet Take 5 mcg by mouth daily     • megestrol (MEGACE) 40 mg tablet 2 tablets by mouth twice daily 120 tablet 3   • metoprolol tartrate (LOPRESSOR) 25 mg tablet Take 25 mg by mouth every 12 (twelve) hours     • neomycin (MYCIFRADIN) 500 mg tablet Take 2 tablets (1,000 mg total) by mouth 3 (three) times a day for 3 doses Take at 1 PM, 4 PM, and 9 PM the day before procedure  6 tablet 0   • acetaminophen (TYLENOL) 500 mg tablet Take 2 tablets (1,000 mg total) by mouth every 6 (six) hours as needed for mild pain 30 tablet 0   • metroNIDAZOLE (FLAGYL) 500 mg tablet Take 1 tablet (500 mg total) by mouth once for 1 dose Take at 9 PM the night before the procedure (Patient not taking: Reported on 2022) 1 tablet 0   • polyethylene glycol (MiraLax) 17 GM/SCOOP powder Mix with 64 oz Gatorade, begin 4 PM day before surgery per bowel prep instructions  (Patient not taking: Reported on 2022) 238 g 0     No current facility-administered medications for this visit  No Known Allergies    Past Medical History:   Diagnosis Date   • Acute hypoxemic respiratory failure due to COVID-19 [facility])     trached-trachael damage-resection  damage to liver feeding tube   renal failure-dialysis   • History of renal failure     s/p sepsis and severe covid, was on dialysis   • History of transfusion    • Primary hypertension        Past Surgical History:   Procedure Laterality Date   • CARPAL TUNNEL RELEASE     • EXPLORATORY LAPAROTOMY     • PEG TUBE PLACEMENT      and removed   • CO HYSTEROSCOPY,W/ENDO BX N/A 10/17/2022    Procedure: DILATATION AND CURETTAGE (D&C) WITH HYSTEROSCOPY;  Surgeon: Joan Evans MD;  [facility];  Service: Gynecology Oncology   • REVISION TRACHEOSTOMY SCAR     • TRACHEOSTOMY     • TUMOR REMOVAL Left     upper arm at age 11, benign       OB History        0    Para   0    Term   0    0    AB   0    Living   0       SAB   0    IAB   0    Ectopic   0    Multiple   0    Live Births   0                 Family History   Problem Relation Age of Onset   • Cancer Mother    • Cancer Father        The following portions of the patient's history were reviewed and updated as appropriate: allergies, current medications, past family history, past medical history, past social history, past surgical history and problem list       Objective:    Blood pressure 150/100, pulse 90, temperature (!) 97 4 °F (36 3 °C), temperature source Temporal, height 5' 2" (1 575 m), weight (!) 138 kg (305 lb), SpO2 95 %  Body mass index is 55 79 kg/m²  Physical Exam  Vitals reviewed  Constitutional:       General: She is not in acute distress  Appearance: Normal appearance  She is not ill-appearing  HENT:      Head: Normocephalic and atraumatic  Mouth/Throat:      Mouth: Mucous membranes are moist    Eyes:      General: No scleral icterus  Right eye: No discharge  Left eye: No discharge  Conjunctiva/sclera: Conjunctivae normal    Pulmonary:      Effort: Pulmonary effort is normal       Breath sounds: No stridor  Musculoskeletal:      Right lower leg: No edema  Left lower leg: No edema  Skin:     General: Skin is warm and dry  Coloration: Skin is not jaundiced  Findings: No rash  Neurological:      General: No focal deficit present  Mental Status: She is alert and oriented to person, place, and time  Cranial Nerves: No cranial nerve deficit  Motor: No weakness  Gait: Gait normal    Psychiatric:         Mood and Affect: Mood normal          Behavior: Behavior normal          Thought Content:  Thought content normal          Judgment: Judgment normal

## 2022-12-13 NOTE — PATIENT INSTRUCTIONS
1  Nothing to eat or drink after midnight prior to the operation  You are allowed clear liquids until 3 hours prior to the scheduled start time of surgery  No milk products or solid food for 8 hours prior to surgery  2   Please avoid ibuprofen, aspirin, fish oil for 7 days prior to surgery  3  You may take your metoprolol and thyroid medication with a sip of water the morning of surgery

## 2022-12-13 NOTE — PROGRESS NOTES
Assessment/Plan:    Problem List Items Addressed This Visit        Other    Morbid obesity with BMI of 45 0-49 9, adult ([facility] [address] )    PMB (postmenopausal bleeding) - Primary     50-year-old with morbid obesity, BMI 55 kilograms/meter squared, history of COVID ARDS requiring intubation, tracheostomy, peg tube placement which was complicated by gastric perforation requiring exploratory laparotomy  Midline incision healed by secondary intention  She has a large incisional hernia  She has tracheal scarring and has been recommended for short segment tracheal resection  She has postmenopausal bleeding and an atypical glandular cell Pap smear  She has a history of complex atypical hyperplasia of the endometrium diagnosed in 2016  She has seen general surgery and has consented for ventral hernia repair  Postmenopausal bleeding is controlled with Megace  Her performance status is 0   1   I discussed the risks and benefits of total abdominal hysterectomy and bilateral salpingo-oophorectomy to be performed simultaneously with exploratory laparotomy and ventral hernia repair  She understands the risks and benefits of the operation and agrees to proceed as outlined  Consent for surgery was obtained by me in the office  2   Preoperative bowel preparation  3  She will continue palliative Megace therapy until surgery  She understands that she will not require hormone therapy postoperatively  4   Referral to the surgical optimization Center preoperatively due to complex upper airway from prior tracheostomy with scarring  After this operation, she will go for consultation for short segment tracheal resection  Relevant Orders    Ambulatory Referral to Surgical Optimization    Ventral hernia without obstruction or gangrene           CHIEF COMPLAINT: Treatment discussion        Patient ID: Herb Delarosa is a 61 y o  female  Who returns for treatment discussion    She was initially scheduled to have ventral hernia repair and hysterectomy performed at another institution, however, it could not be performed as there were no gynecology services available at that hospital   She has seen Dr Marty Solano here and has consented for ventral hernia repair with scar revision  She is here to discuss the risks and benefits of hysterectomy and bilateral salpingo-oophorectomy to treat her postmenopausal bleeding  Bleeding has been palliated with Megace therapy  D&C in October 2022 revealed chronic endometritis without evidence of hyperplasia or malignancy  No other interval change in medications or medical history since her last visit to the office  Review of Systems   Constitutional: Negative for activity change and unexpected weight change  HENT: Negative  Eyes: Negative  Respiratory: Positive for shortness of breath  Shortness of breath with exertion   Cardiovascular: Negative  Gastrointestinal: Negative for abdominal distention and abdominal pain  Endocrine: Negative  Genitourinary: Negative for pelvic pain and vaginal bleeding  Musculoskeletal: Positive for arthralgias and gait problem  Skin: Negative  Allergic/Immunologic: Negative  Hematological: Negative  Psychiatric/Behavioral: Negative  Current Outpatient Medications   Medication Sig Dispense Refill   • aspirin (ECOTRIN LOW STRENGTH) 81 mg EC tablet Take 81 mg by mouth daily     • levothyroxine 100 mcg tablet Take 100 mcg by mouth daily     • liothyronine (CYTOMEL) 5 mcg tablet Take 5 mcg by mouth daily     • megestrol (MEGACE) 40 mg tablet 2 tablets by mouth twice daily 120 tablet 3   • metoprolol tartrate (LOPRESSOR) 25 mg tablet Take 25 mg by mouth every 12 (twelve) hours     • neomycin (MYCIFRADIN) 500 mg tablet Take 2 tablets (1,000 mg total) by mouth 3 (three) times a day for 3 doses Take at 1 PM, 4 PM, and 9 PM the day before procedure   6 tablet 0   • acetaminophen (TYLENOL) 500 mg tablet Take 2 tablets (1,000 mg total) by mouth every 6 (six) hours as needed for mild pain 30 tablet 0   • metroNIDAZOLE (FLAGYL) 500 mg tablet Take 1 tablet (500 mg total) by mouth once for 1 dose Take at 9 PM the night before the procedure (Patient not taking: Reported on 2022) 1 tablet 0   • polyethylene glycol (MiraLax) 17 GM/SCOOP powder Mix with 64 oz Gatorade, begin 4 PM day before surgery per bowel prep instructions  (Patient not taking: Reported on 2022) 238 g 0     No current facility-administered medications for this visit  No Known Allergies    Past Medical History:   Diagnosis Date   • Acute hypoxemic respiratory failure due to COVID-19 [facility])     trached-trachael damage-resection  damage to liver feeding tube   renal failure-dialysis   • History of renal failure     s/p sepsis and severe covid, was on dialysis   • History of transfusion    • Primary hypertension        Past Surgical History:   Procedure Laterality Date   • CARPAL TUNNEL RELEASE     • EXPLORATORY LAPAROTOMY     • PEG TUBE PLACEMENT      and removed   • KS HYSTEROSCOPY,W/ENDO BX N/A 10/17/2022    Procedure: DILATATION AND CURETTAGE (D&C) WITH HYSTEROSCOPY;  Surgeon: Leonora Rodriguez MD;  Location: [facility];  Service: Gynecology Oncology   • REVISION TRACHEOSTOMY SCAR     • TRACHEOSTOMY     • TUMOR REMOVAL Left     upper arm at age 11, benign       OB History        0    Para   0    Term   0       0    AB   0    Living   0       SAB   0    IAB   0    Ectopic   0    Multiple   0    Live Births   0                 Family History   Problem Relation Age of Onset   • Cancer Mother    • Cancer Father        The following portions of the patient's history were reviewed and updated as appropriate: allergies, current medications, past family history, past medical history, past social history, past surgical history and problem list       Objective:    Blood pressure 150/100, pulse 90, temperature (!) 97 4 °F (36 3 °C), temperature source Temporal, height 5' 2" (1 575 m), weight (!) 138 kg (305 lb), SpO2 95 %  Body mass index is 55 79 kg/m²  Physical Exam  Vitals reviewed  Constitutional:       General: She is not in acute distress  Appearance: Normal appearance  She is not ill-appearing  HENT:      Head: Normocephalic and atraumatic  Mouth/Throat:      Mouth: Mucous membranes are moist    Eyes:      General: No scleral icterus  Right eye: No discharge  Left eye: No discharge  Conjunctiva/sclera: Conjunctivae normal    Pulmonary:      Effort: Pulmonary effort is normal       Breath sounds: No stridor  Musculoskeletal:      Right lower leg: No edema  Left lower leg: No edema  Skin:     General: Skin is warm and dry  Coloration: Skin is not jaundiced  Findings: No rash  Neurological:      General: No focal deficit present  Mental Status: She is alert and oriented to person, place, and time  Cranial Nerves: No cranial nerve deficit  Motor: No weakness  Gait: Gait normal    Psychiatric:         Mood and Affect: Mood normal          Behavior: Behavior normal          Thought Content:  Thought content normal          Judgment: Judgment normal

## 2022-12-13 NOTE — ASSESSMENT & PLAN NOTE
13-year-old with morbid obesity, BMI 55 kilograms/meter squared, history of COVID ARDS requiring intubation, tracheostomy, peg tube placement which was complicated by gastric perforation requiring exploratory laparotomy  Midline incision healed by secondary intention  She has a large incisional hernia  She has tracheal scarring and has been recommended for short segment tracheal resection  She has postmenopausal bleeding and an atypical glandular cell Pap smear  She has a history of complex atypical hyperplasia of the endometrium diagnosed in 2016  She has seen general surgery and has consented for ventral hernia repair  Postmenopausal bleeding is controlled with Megace  Her performance status is 0   1   I discussed the risks and benefits of total abdominal hysterectomy and bilateral salpingo-oophorectomy to be performed simultaneously with exploratory laparotomy and ventral hernia repair  She understands the risks and benefits of the operation and agrees to proceed as outlined  Consent for surgery was obtained by me in the office  2   Preoperative bowel preparation  3  She will continue palliative Megace therapy until surgery  She understands that she will not require hormone therapy postoperatively  4   Referral to the surgical optimization Center preoperatively due to complex upper airway from prior tracheostomy with scarring  After this operation, she will go for consultation for short segment tracheal resection

## 2022-12-14 ENCOUNTER — TELEPHONE (OUTPATIENT)
Dept: GYNECOLOGIC ONCOLOGY | Facility: CLINIC | Age: 57
End: 2022-12-14

## 2022-12-14 NOTE — ASSESSMENT & PLAN NOTE
I discussed with her what a hernia repair would entail  She has a very wide scar she would prefer that be excised  I told her it be reasonable to excise the scar and would need to take her bellybutton as well  She understands and is agreeable to that  I explained doing a open hernia repair with component separation and the use of mesh  We discussed the risks, benefits, alternatives, and what to expect postoperatively  She understands and is agreeable to go ahead  Plan: Open incisional hernia repair with probable component separation and mesh placement

## 2022-12-19 ENCOUNTER — TELEPHONE (OUTPATIENT)
Dept: SURGICAL ONCOLOGY | Facility: CLINIC | Age: 57
End: 2022-12-19

## 2022-12-19 NOTE — TELEPHONE ENCOUNTER
Patient called in to get information on where/how to send records to us regarding her throat ahead of her surgery with Dr Alanis in January  Provided her with address for Radiology to send reports and discs to  Also provided her with rightfax number to fax any other records to  She was agreeable

## 2023-01-18 ENCOUNTER — TELEPHONE (OUTPATIENT)
Dept: GYNECOLOGIC ONCOLOGY | Facility: CLINIC | Age: 58
End: 2023-01-18

## 2023-01-18 NOTE — TELEPHONE ENCOUNTER
Patient called into the office asking if she is able to [facility] her disc to the office  Patient was given an address

## 2023-01-20 ENCOUNTER — HOSPITAL ENCOUNTER (OUTPATIENT)
Dept: RADIOLOGY | Facility: HOSPITAL | Age: 58
Discharge: HOME | End: 2023-01-20
Attending: RADIOLOGY

## 2023-01-20 DIAGNOSIS — Z76.89 REFERRAL OF PATIENT WITHOUT EXAMINATION OR TREATMENT: ICD-10-CM

## 2023-01-20 RX ORDER — DOXYCYCLINE 100 MG/1
CAPSULE ORAL
COMMUNITY
Start: 2022-11-02 | End: 2023-02-02

## 2023-01-20 RX ORDER — MAGNESIUM GLYCINATE 100 MG
100 TABLET ORAL
COMMUNITY
Start: 2022-11-14 | End: 2023-02-02

## 2023-01-20 RX ORDER — AZITHROMYCIN 250 MG/1
TABLET, FILM COATED ORAL
COMMUNITY
Start: 2022-12-14 | End: 2023-02-02

## 2023-01-20 RX ORDER — LANOLIN ALCOHOL/MO/W.PET/CERES
CREAM (GRAM) TOPICAL
COMMUNITY
Start: 2022-11-14

## 2023-01-20 NOTE — PRE-PROCEDURE INSTRUCTIONS
Pre-Surgery Instructions:   Medication Instructions   • acetaminophen (TYLENOL) 500 mg tablet Uses PRN- OK to take day of surgery   • aspirin (ECOTRIN LOW STRENGTH) 81 mg EC tablet Stop taking 7 days prior to surgery  • azithromycin (ZITHROMAX) 250 mg tablet Instructions provided by MD   • Cholecalciferol 125 MCG (5000 UT) capsule Stop taking 7 days prior to surgery  • co-enzyme Q-10 30 MG capsule Stop taking 7 days prior to surgery  • doxycycline monohydrate (MONODOX) 100 mg capsule Instructions provided by MD   • levothyroxine 100 mcg tablet Take day of surgery  • liothyronine (CYTOMEL) 5 mcg tablet Hold day of surgery  • Magnesium Bisglycinate (Mag Glycinate) 100 MG TABS Stop taking 7 days prior to surgery  • megestrol (MEGACE) 40 mg tablet Hold day of surgery  • metoprolol tartrate (LOPRESSOR) 25 mg tablet Take day of surgery  • polyethylene glycol (MiraLax) 17 GM/SCOOP powder Instructions provided by MD   • vitamin B-12 (VITAMIN B-12) 1,000 mcg tablet Stop taking 7 days prior to surgery  NPO after midnight, meds in am with sips of water  Understands when to expect preop phone call  Remove all jewelry  Advised of visitation policy  Before your operation, you play an important role in decreasing your risk for infection by washing with special antiseptic soap  This is an effective way to reduce bacteria on the skin which may help to prevent infections at the surgical site  Please read the following directions in advance  1  In the week before your operation purchase a 4 ounce bottle of antiseptic soap containing chlorhexidine gluconate 4%  Some brand names include: Aplicare, Endure, and Hibiclens  The cost is usually less than $5 00  · For your convenience, the [address] [place] [address] carries the soap  · It may also be available at your doctor's office or pre-admission testing center, and at most retail pharmacies    · If you are allergic or sensitive to soaps containing chlorhexidine gluconate (CHG), please let your doctor know so another antiseptic soap can be suggested  · CHG antiseptic soap is for external use only  2      The day before your operation follow these directions carefully to get ready  · Place clean lines (sheets) on your bed; you should sleep on clean sheets after your evening shower  · Get clean towels and washcloths ready - you need enough for 2 showers  · Set aside clean underwear, pajamas, and clothing to wear after the shower  Reminders:  · DO NOT use any other soap or body rinse on your skin during or after the antiseptic showers  · DO NOT use lotion , powder, deodorant, or perfume/aftershave of any kind on your skin after your antiseptic shower  · DO NOT shave any body parts in the 24 hours/the day before your operation  · DO NOT get the antiseptic soap in your eyes, ears, nose, mouth, or vaginal area  3      You will need to shower the night before AND the morning of your Surgery  Shower 1:  · The evening before your operation, take the fist shower  · First, shampoo your hair with regular shampoo and rinse it completely before you use the anitseptic soap  After washing and rinsing your hair, rinse your body  · Next, use a clean wash cloth to apply the antiseptic soap and wash your body from the neck down to your toes using 1/2 bottle of the antiseptic soap  You will use the other 1/2 bottle for the second shower  · Clean the area where your incision will be; later this area well for about 2 minutes  · If you ar having head or neck surgery, wash areas with the antiseptic soap  · Rinse yourself completely with running water  · Use a clean towel to dry off  · Wear clean underwear and clothing/pajamas  Shower 2:  · The Morning of your operation, take the second shower following the same steps as Shower 1 using the second 1/2 of the bottle of antiseptic soap  · Use clean cloths and towels to was and dry yourself off    · Wear clean underwear and clothing

## 2023-01-25 ENCOUNTER — ANESTHESIA EVENT (INPATIENT)
Dept: PERIOP | Facility: HOSPITAL | Age: 58
End: 2023-01-25

## 2023-01-27 ENCOUNTER — APPOINTMENT (OUTPATIENT)
Dept: RADIOLOGY | Facility: HOSPITAL | Age: 58
End: 2023-01-27

## 2023-01-27 ENCOUNTER — HOSPITAL ENCOUNTER (INPATIENT)
Facility: HOSPITAL | Age: 58
LOS: 6 days | Discharge: HOME | End: 2023-02-02
Attending: OBSTETRICS & GYNECOLOGY | Admitting: OBSTETRICS & GYNECOLOGY

## 2023-01-27 ENCOUNTER — ANESTHESIA (INPATIENT)
Dept: PERIOP | Facility: HOSPITAL | Age: 58
End: 2023-01-27

## 2023-01-27 DIAGNOSIS — Z90.79 S/P TAH-BSO (TOTAL ABDOMINAL HYSTERECTOMY AND BILATERAL SALPINGO-OOPHORECTOMY): ICD-10-CM

## 2023-01-27 DIAGNOSIS — Z90.710 S/P ABDOMINAL HYSTERECTOMY: Primary | ICD-10-CM

## 2023-01-27 DIAGNOSIS — K43.2 INCISIONAL HERNIA, WITHOUT OBSTRUCTION OR GANGRENE: ICD-10-CM

## 2023-01-27 DIAGNOSIS — Z78.9 DEEP VEIN THROMBOSIS (DVT) PROPHYLAXIS PRESCRIBED AT DISCHARGE: Primary | ICD-10-CM

## 2023-01-27 DIAGNOSIS — Z90.710 H/O ABDOMINAL HYSTERECTOMY: ICD-10-CM

## 2023-01-27 DIAGNOSIS — N95.0 PMB (POSTMENOPAUSAL BLEEDING): ICD-10-CM

## 2023-01-27 DIAGNOSIS — E66.01 MORBID OBESITY WITH BMI OF 45.0-49.9, ADULT (HCC): ICD-10-CM

## 2023-01-27 DIAGNOSIS — Z90.722 S/P TAH-BSO (TOTAL ABDOMINAL HYSTERECTOMY AND BILATERAL SALPINGO-OOPHORECTOMY): ICD-10-CM

## 2023-01-27 DIAGNOSIS — Z90.710 S/P TAH-BSO (TOTAL ABDOMINAL HYSTERECTOMY AND BILATERAL SALPINGO-OOPHORECTOMY): ICD-10-CM

## 2023-01-27 LAB
ABO GROUP BLD: NORMAL
BLD GP AB SCN SERPL QL: NEGATIVE
GLUCOSE SERPL-MCNC: 134 MG/DL (ref 65–140)
GLUCOSE SERPL-MCNC: 136 MG/DL (ref 65–140)
RH BLD: POSITIVE
SPECIMEN EXPIRATION DATE: NORMAL

## 2023-01-27 PROCEDURE — 02HV33Z INSERTION OF INFUSION DEVICE INTO SUPERIOR VENA CAVA, PERCUTANEOUS APPROACH: ICD-10-PCS | Performed by: STUDENT IN AN ORGANIZED HEALTH CARE EDUCATION/TRAINING PROGRAM

## 2023-01-27 PROCEDURE — 0KNL0ZZ RELEASE LEFT ABDOMEN MUSCLE, OPEN APPROACH: ICD-10-PCS | Performed by: SURGERY

## 2023-01-27 PROCEDURE — 0UNF0ZZ RELEASE CUL-DE-SAC, OPEN APPROACH: ICD-10-PCS | Performed by: OBSTETRICS & GYNECOLOGY

## 2023-01-27 PROCEDURE — 0DN80ZZ RELEASE SMALL INTESTINE, OPEN APPROACH: ICD-10-PCS | Performed by: OBSTETRICS & GYNECOLOGY

## 2023-01-27 PROCEDURE — 0UT90ZZ RESECTION OF UTERUS, OPEN APPROACH: ICD-10-PCS | Performed by: OBSTETRICS & GYNECOLOGY

## 2023-01-27 PROCEDURE — 0UT20ZZ RESECTION OF BILATERAL OVARIES, OPEN APPROACH: ICD-10-PCS | Performed by: OBSTETRICS & GYNECOLOGY

## 2023-01-27 PROCEDURE — 0KNK0ZZ RELEASE RIGHT ABDOMEN MUSCLE, OPEN APPROACH: ICD-10-PCS | Performed by: SURGERY

## 2023-01-27 PROCEDURE — 0UT70ZZ RESECTION OF BILATERAL FALLOPIAN TUBES, OPEN APPROACH: ICD-10-PCS | Performed by: OBSTETRICS & GYNECOLOGY

## 2023-01-27 PROCEDURE — 0DNE0ZZ RELEASE LARGE INTESTINE, OPEN APPROACH: ICD-10-PCS | Performed by: OBSTETRICS & GYNECOLOGY

## 2023-01-27 PROCEDURE — 0WUF0JZ SUPPLEMENT ABDOMINAL WALL WITH SYNTHETIC SUBSTITUTE, OPEN APPROACH: ICD-10-PCS | Performed by: SURGERY

## 2023-01-27 DEVICE — PHASIX MESH, 10" X 12" (25.4 CM X 30.5 CM), RECTANGLE
Type: IMPLANTABLE DEVICE | Site: ABDOMEN | Status: FUNCTIONAL
Brand: PHASIX

## 2023-01-27 RX ORDER — HEPARIN SODIUM 5000 [USP'U]/ML
INJECTION, SOLUTION INTRAVENOUS; SUBCUTANEOUS AS NEEDED
Status: DISCONTINUED | OUTPATIENT
Start: 2023-01-27 | End: 2023-01-27

## 2023-01-27 RX ORDER — ONDANSETRON 2 MG/ML
INJECTION INTRAMUSCULAR; INTRAVENOUS AS NEEDED
Status: DISCONTINUED | OUTPATIENT
Start: 2023-01-27 | End: 2023-01-27

## 2023-01-27 RX ORDER — ONDANSETRON 2 MG/ML
4 INJECTION INTRAMUSCULAR; INTRAVENOUS ONCE AS NEEDED
Status: DISCONTINUED | OUTPATIENT
Start: 2023-01-27 | End: 2023-01-27 | Stop reason: HOSPADM

## 2023-01-27 RX ORDER — LIDOCAINE HYDROCHLORIDE 20 MG/ML
INJECTION, SOLUTION EPIDURAL; INFILTRATION; INTRACAUDAL; PERINEURAL AS NEEDED
Status: DISCONTINUED | OUTPATIENT
Start: 2023-01-27 | End: 2023-01-27

## 2023-01-27 RX ORDER — LIOTHYRONINE SODIUM 5 UG/1
5 TABLET ORAL DAILY
Status: DISCONTINUED | OUTPATIENT
Start: 2023-01-28 | End: 2023-02-02 | Stop reason: HOSPADM

## 2023-01-27 RX ORDER — HYDROMORPHONE HCL/PF 1 MG/ML
0.5 SYRINGE (ML) INJECTION
Status: DISCONTINUED | OUTPATIENT
Start: 2023-01-27 | End: 2023-01-27 | Stop reason: HOSPADM

## 2023-01-27 RX ORDER — MIDAZOLAM HYDROCHLORIDE 2 MG/2ML
INJECTION, SOLUTION INTRAMUSCULAR; INTRAVENOUS AS NEEDED
Status: DISCONTINUED | OUTPATIENT
Start: 2023-01-27 | End: 2023-01-27

## 2023-01-27 RX ORDER — GLYCOPYRROLATE 0.2 MG/ML
INJECTION INTRAMUSCULAR; INTRAVENOUS AS NEEDED
Status: DISCONTINUED | OUTPATIENT
Start: 2023-01-27 | End: 2023-01-27

## 2023-01-27 RX ORDER — DOCUSATE SODIUM 100 MG/1
100 CAPSULE, LIQUID FILLED ORAL 2 TIMES DAILY
Status: DISCONTINUED | OUTPATIENT
Start: 2023-01-27 | End: 2023-02-02 | Stop reason: HOSPADM

## 2023-01-27 RX ORDER — ACETAMINOPHEN 325 MG/1
975 TABLET ORAL EVERY 8 HOURS SCHEDULED
Status: DISCONTINUED | OUTPATIENT
Start: 2023-01-27 | End: 2023-02-02 | Stop reason: HOSPADM

## 2023-01-27 RX ORDER — OXYCODONE HYDROCHLORIDE 10 MG/1
10 TABLET ORAL EVERY 4 HOURS PRN
Status: DISCONTINUED | OUTPATIENT
Start: 2023-01-27 | End: 2023-02-02 | Stop reason: HOSPADM

## 2023-01-27 RX ORDER — ROCURONIUM BROMIDE 10 MG/ML
INJECTION, SOLUTION INTRAVENOUS AS NEEDED
Status: DISCONTINUED | OUTPATIENT
Start: 2023-01-27 | End: 2023-01-27

## 2023-01-27 RX ORDER — ROPIVACAINE HYDROCHLORIDE 2 MG/ML
INJECTION, SOLUTION EPIDURAL; INFILTRATION; PERINEURAL AS NEEDED
Status: DISCONTINUED | OUTPATIENT
Start: 2023-01-27 | End: 2023-01-27

## 2023-01-27 RX ORDER — HYDROMORPHONE HCL/PF 1 MG/ML
SYRINGE (ML) INJECTION AS NEEDED
Status: DISCONTINUED | OUTPATIENT
Start: 2023-01-27 | End: 2023-01-27

## 2023-01-27 RX ORDER — OXYCODONE HYDROCHLORIDE 5 MG/1
5 TABLET ORAL EVERY 4 HOURS PRN
Status: DISCONTINUED | OUTPATIENT
Start: 2023-01-27 | End: 2023-02-02 | Stop reason: HOSPADM

## 2023-01-27 RX ORDER — MIDAZOLAM HYDROCHLORIDE 2 MG/2ML
INJECTION, SOLUTION INTRAMUSCULAR; INTRAVENOUS
Status: COMPLETED | OUTPATIENT
Start: 2023-01-27 | End: 2023-01-27

## 2023-01-27 RX ORDER — METOPROLOL TARTRATE 50 MG/1
50 TABLET, FILM COATED ORAL EVERY 12 HOURS SCHEDULED
Status: DISCONTINUED | OUTPATIENT
Start: 2023-01-27 | End: 2023-01-28

## 2023-01-27 RX ORDER — SUCCINYLCHOLINE/SOD CL,ISO/PF 100 MG/5ML
SYRINGE (ML) INTRAVENOUS AS NEEDED
Status: DISCONTINUED | OUTPATIENT
Start: 2023-01-27 | End: 2023-01-27

## 2023-01-27 RX ORDER — HYDROMORPHONE HCL IN WATER/PF 6 MG/30 ML
0.2 PATIENT CONTROLLED ANALGESIA SYRINGE INTRAVENOUS EVERY 2 HOUR PRN
Status: DISCONTINUED | OUTPATIENT
Start: 2023-01-27 | End: 2023-01-28 | Stop reason: HOSPADM

## 2023-01-27 RX ORDER — HEPARIN SODIUM 5000 [USP'U]/ML
5000 INJECTION, SOLUTION INTRAVENOUS; SUBCUTANEOUS
Status: DISCONTINUED | OUTPATIENT
Start: 2023-01-27 | End: 2023-01-27 | Stop reason: HOSPADM

## 2023-01-27 RX ORDER — LEVOTHYROXINE SODIUM 0.1 MG/1
100 TABLET ORAL DAILY
Status: DISCONTINUED | OUTPATIENT
Start: 2023-01-28 | End: 2023-02-02 | Stop reason: HOSPADM

## 2023-01-27 RX ORDER — SODIUM CHLORIDE, SODIUM LACTATE, POTASSIUM CHLORIDE, CALCIUM CHLORIDE 600; 310; 30; 20 MG/100ML; MG/100ML; MG/100ML; MG/100ML
125 INJECTION, SOLUTION INTRAVENOUS CONTINUOUS
Status: DISCONTINUED | OUTPATIENT
Start: 2023-01-27 | End: 2023-01-27

## 2023-01-27 RX ORDER — FENTANYL CITRATE 50 UG/ML
100 INJECTION, SOLUTION INTRAMUSCULAR; INTRAVENOUS ONCE
Status: DISCONTINUED | OUTPATIENT
Start: 2023-01-27 | End: 2023-01-27 | Stop reason: HOSPADM

## 2023-01-27 RX ORDER — ACETAMINOPHEN 325 MG/1
975 TABLET ORAL ONCE
Status: COMPLETED | OUTPATIENT
Start: 2023-01-27 | End: 2023-01-27

## 2023-01-27 RX ORDER — EPHEDRINE SULFATE 50 MG/ML
INJECTION INTRAVENOUS AS NEEDED
Status: DISCONTINUED | OUTPATIENT
Start: 2023-01-27 | End: 2023-01-27

## 2023-01-27 RX ORDER — FENTANYL CITRATE 50 UG/ML
INJECTION, SOLUTION INTRAMUSCULAR; INTRAVENOUS AS NEEDED
Status: DISCONTINUED | OUTPATIENT
Start: 2023-01-27 | End: 2023-01-27

## 2023-01-27 RX ORDER — MAGNESIUM HYDROXIDE 1200 MG/15ML
LIQUID ORAL AS NEEDED
Status: DISCONTINUED | OUTPATIENT
Start: 2023-01-27 | End: 2023-01-27 | Stop reason: HOSPADM

## 2023-01-27 RX ORDER — DEXAMETHASONE SODIUM PHOSPHATE 10 MG/ML
INJECTION, SOLUTION INTRAMUSCULAR; INTRAVENOUS AS NEEDED
Status: DISCONTINUED | OUTPATIENT
Start: 2023-01-27 | End: 2023-01-27

## 2023-01-27 RX ORDER — LIDOCAINE HYDROCHLORIDE 10 MG/ML
INJECTION, SOLUTION EPIDURAL; INFILTRATION; INTRACAUDAL; PERINEURAL AS NEEDED
Status: DISCONTINUED | OUTPATIENT
Start: 2023-01-27 | End: 2023-01-27

## 2023-01-27 RX ORDER — FENTANYL CITRATE/PF 50 MCG/ML
25 SYRINGE (ML) INJECTION
Status: DISCONTINUED | OUTPATIENT
Start: 2023-01-27 | End: 2023-01-27 | Stop reason: HOSPADM

## 2023-01-27 RX ORDER — ROPIVACAINE HYDROCHLORIDE 2 MG/ML
INJECTION, SOLUTION EPIDURAL; INFILTRATION; PERINEURAL CONTINUOUS
Status: DISCONTINUED | OUTPATIENT
Start: 2023-01-27 | End: 2023-01-28

## 2023-01-27 RX ORDER — FENTANYL CITRATE 50 UG/ML
INJECTION, SOLUTION INTRAMUSCULAR; INTRAVENOUS
Status: COMPLETED | OUTPATIENT
Start: 2023-01-27 | End: 2023-01-27

## 2023-01-27 RX ORDER — CEFAZOLIN SODIUM 2 G/50ML
2000 SOLUTION INTRAVENOUS ONCE
Status: DISCONTINUED | OUTPATIENT
Start: 2023-01-27 | End: 2023-01-27 | Stop reason: HOSPADM

## 2023-01-27 RX ORDER — PROPOFOL 10 MG/ML
INJECTION, EMULSION INTRAVENOUS AS NEEDED
Status: DISCONTINUED | OUTPATIENT
Start: 2023-01-27 | End: 2023-01-27

## 2023-01-27 RX ORDER — DEXTROSE, SODIUM CHLORIDE, AND POTASSIUM CHLORIDE 5; .45; .15 G/100ML; G/100ML; G/100ML
125 INJECTION INTRAVENOUS CONTINUOUS
Status: DISCONTINUED | OUTPATIENT
Start: 2023-01-27 | End: 2023-01-29

## 2023-01-27 RX ORDER — GABAPENTIN 100 MG/1
200 CAPSULE ORAL ONCE
Status: COMPLETED | OUTPATIENT
Start: 2023-01-27 | End: 2023-01-27

## 2023-01-27 RX ORDER — HEPARIN SODIUM 5000 [USP'U]/ML
7500 INJECTION, SOLUTION INTRAVENOUS; SUBCUTANEOUS EVERY 8 HOURS SCHEDULED
Status: DISCONTINUED | OUTPATIENT
Start: 2023-01-27 | End: 2023-01-28

## 2023-01-27 RX ORDER — MIDAZOLAM HYDROCHLORIDE 2 MG/2ML
2 INJECTION, SOLUTION INTRAMUSCULAR; INTRAVENOUS ONCE
Status: DISCONTINUED | OUTPATIENT
Start: 2023-01-27 | End: 2023-01-27 | Stop reason: HOSPADM

## 2023-01-27 RX ORDER — ONDANSETRON 2 MG/ML
4 INJECTION INTRAMUSCULAR; INTRAVENOUS EVERY 6 HOURS PRN
Status: DISCONTINUED | OUTPATIENT
Start: 2023-01-27 | End: 2023-01-30

## 2023-01-27 RX ADMIN — ROPIVACAINE HYDROCHLORIDE: 2 INJECTION, SOLUTION EPIDURAL; INFILTRATION at 16:05

## 2023-01-27 RX ADMIN — FENTANYL CITRATE 50 MCG: 50 INJECTION, SOLUTION INTRAMUSCULAR; INTRAVENOUS at 08:26

## 2023-01-27 RX ADMIN — DEXAMETHASONE SODIUM PHOSPHATE 10 MG: 10 INJECTION, SOLUTION INTRAMUSCULAR; INTRAVENOUS at 09:57

## 2023-01-27 RX ADMIN — SODIUM CHLORIDE, SODIUM LACTATE, POTASSIUM CHLORIDE, AND CALCIUM CHLORIDE: .6; .31; .03; .02 INJECTION, SOLUTION INTRAVENOUS at 08:56

## 2023-01-27 RX ADMIN — ROCURONIUM BROMIDE 30 MG: 10 INJECTION, SOLUTION INTRAVENOUS at 11:20

## 2023-01-27 RX ADMIN — ROPIVACAINE HYDROCHLORIDE 5 ML: 2 INJECTION, SOLUTION EPIDURAL; INFILTRATION at 15:17

## 2023-01-27 RX ADMIN — OXYCODONE HYDROCHLORIDE 10 MG: 10 TABLET ORAL at 20:01

## 2023-01-27 RX ADMIN — Medication 3000 MG: at 14:15

## 2023-01-27 RX ADMIN — ACETAMINOPHEN 975 MG: 325 TABLET ORAL at 08:50

## 2023-01-27 RX ADMIN — FENTANYL CITRATE 50 MCG: 50 INJECTION, SOLUTION INTRAMUSCULAR; INTRAVENOUS at 09:17

## 2023-01-27 RX ADMIN — SODIUM CHLORIDE, SODIUM LACTATE, POTASSIUM CHLORIDE, AND CALCIUM CHLORIDE: .6; .31; .03; .02 INJECTION, SOLUTION INTRAVENOUS at 11:17

## 2023-01-27 RX ADMIN — FENTANYL CITRATE 25 MCG: 50 INJECTION INTRAMUSCULAR; INTRAVENOUS at 17:26

## 2023-01-27 RX ADMIN — HEPARIN SODIUM 7500 UNITS: 5000 INJECTION INTRAVENOUS; SUBCUTANEOUS at 11:18

## 2023-01-27 RX ADMIN — EPHEDRINE SULFATE 10 MG: 50 INJECTION, SOLUTION INTRAVENOUS at 10:23

## 2023-01-27 RX ADMIN — SODIUM CHLORIDE, SODIUM LACTATE, POTASSIUM CHLORIDE, AND CALCIUM CHLORIDE: .6; .31; .03; .02 INJECTION, SOLUTION INTRAVENOUS at 12:32

## 2023-01-27 RX ADMIN — MIDAZOLAM 2 MG: 1 INJECTION INTRAMUSCULAR; INTRAVENOUS at 08:30

## 2023-01-27 RX ADMIN — ROCURONIUM BROMIDE 20 MG: 10 INJECTION, SOLUTION INTRAVENOUS at 13:21

## 2023-01-27 RX ADMIN — HYDROMORPHONE HYDROCHLORIDE 0.5 MG: 1 INJECTION, SOLUTION INTRAMUSCULAR; INTRAVENOUS; SUBCUTANEOUS at 15:58

## 2023-01-27 RX ADMIN — EPHEDRINE SULFATE 10 MG: 50 INJECTION, SOLUTION INTRAVENOUS at 10:04

## 2023-01-27 RX ADMIN — Medication 140 MG: at 09:52

## 2023-01-27 RX ADMIN — SODIUM CHLORIDE, SODIUM LACTATE, POTASSIUM CHLORIDE, AND CALCIUM CHLORIDE: .6; .31; .03; .02 INJECTION, SOLUTION INTRAVENOUS at 12:13

## 2023-01-27 RX ADMIN — METOPROLOL TARTRATE 50 MG: 50 TABLET ORAL at 22:23

## 2023-01-27 RX ADMIN — SODIUM CHLORIDE, SODIUM LACTATE, POTASSIUM CHLORIDE, AND CALCIUM CHLORIDE: .6; .31; .03; .02 INJECTION, SOLUTION INTRAVENOUS at 08:21

## 2023-01-27 RX ADMIN — EPHEDRINE SULFATE 10 MG: 50 INJECTION, SOLUTION INTRAVENOUS at 10:27

## 2023-01-27 RX ADMIN — GABAPENTIN 200 MG: 100 CAPSULE ORAL at 08:50

## 2023-01-27 RX ADMIN — SODIUM CHLORIDE, SODIUM LACTATE, POTASSIUM CHLORIDE, AND CALCIUM CHLORIDE: .6; .31; .03; .02 INJECTION, SOLUTION INTRAVENOUS at 14:33

## 2023-01-27 RX ADMIN — ROCURONIUM BROMIDE 50 MG: 10 INJECTION, SOLUTION INTRAVENOUS at 09:58

## 2023-01-27 RX ADMIN — HEPARIN SODIUM 7500 UNITS: 5000 INJECTION INTRAVENOUS; SUBCUTANEOUS at 20:01

## 2023-01-27 RX ADMIN — HYDROMORPHONE HYDROCHLORIDE 0.2 MG: 0.2 INJECTION, SOLUTION INTRAMUSCULAR; INTRAVENOUS; SUBCUTANEOUS at 22:29

## 2023-01-27 RX ADMIN — LIDOCAINE HYDROCHLORIDE 100 MG: 20 INJECTION, SOLUTION EPIDURAL; INFILTRATION; INTRACAUDAL; PERINEURAL at 09:49

## 2023-01-27 RX ADMIN — ROPIVACAINE HYDROCHLORIDE 5 ML: 2 INJECTION, SOLUTION EPIDURAL; INFILTRATION at 15:08

## 2023-01-27 RX ADMIN — DEXTROSE, SODIUM CHLORIDE, AND POTASSIUM CHLORIDE 125 ML/HR: 5; .45; .15 INJECTION INTRAVENOUS at 16:50

## 2023-01-27 RX ADMIN — ROCURONIUM BROMIDE 10 MG: 10 INJECTION, SOLUTION INTRAVENOUS at 12:46

## 2023-01-27 RX ADMIN — EPHEDRINE SULFATE 10 MG: 50 INJECTION, SOLUTION INTRAVENOUS at 09:59

## 2023-01-27 RX ADMIN — ACETAMINOPHEN 975 MG: 325 TABLET ORAL at 22:23

## 2023-01-27 RX ADMIN — GLYCOPYRROLATE 0.2 MG: 0.2 INJECTION INTRAMUSCULAR; INTRAVENOUS at 10:07

## 2023-01-27 RX ADMIN — PHENYLEPHRINE HYDROCHLORIDE 20 MCG/MIN: 10 INJECTION INTRAVENOUS at 09:56

## 2023-01-27 RX ADMIN — PROPOFOL 20 MG: 10 INJECTION, EMULSION INTRAVENOUS at 09:50

## 2023-01-27 RX ADMIN — SUGAMMADEX 280 MG: 100 INJECTION, SOLUTION INTRAVENOUS at 15:25

## 2023-01-27 RX ADMIN — ONDANSETRON 4 MG: 2 INJECTION INTRAMUSCULAR; INTRAVENOUS at 14:45

## 2023-01-27 RX ADMIN — PHENYLEPHRINE HYDROCHLORIDE 50 MCG/MIN: 10 INJECTION INTRAVENOUS at 10:06

## 2023-01-27 RX ADMIN — FENTANYL CITRATE 25 MCG: 50 INJECTION INTRAMUSCULAR; INTRAVENOUS at 16:54

## 2023-01-27 RX ADMIN — ROPIVACAINE HYDROCHLORIDE 5 ML: 2 INJECTION, SOLUTION EPIDURAL; INFILTRATION at 15:55

## 2023-01-27 RX ADMIN — PROPOFOL 20 MG: 10 INJECTION, EMULSION INTRAVENOUS at 09:49

## 2023-01-27 RX ADMIN — HYDROMORPHONE HYDROCHLORIDE 1 MG: 1 INJECTION, SOLUTION INTRAMUSCULAR; INTRAVENOUS; SUBCUTANEOUS at 11:22

## 2023-01-27 RX ADMIN — Medication 3000 MG: at 10:20

## 2023-01-27 RX ADMIN — ROPIVACAINE HYDROCHLORIDE 5 ML: 2 INJECTION, SOLUTION EPIDURAL; INFILTRATION at 15:02

## 2023-01-27 RX ADMIN — PROPOFOL 20 MG: 10 INJECTION, EMULSION INTRAVENOUS at 09:51

## 2023-01-27 RX ADMIN — MIDAZOLAM 2 MG: 1 INJECTION INTRAMUSCULAR; INTRAVENOUS at 09:25

## 2023-01-27 NOTE — ANESTHESIA PREPROCEDURE EVALUATION
Procedure:  HYSTERECTOMY TOTAL ABDOMINAL (HILTON), BILATERAL SALPINGO-OOPHORECTOMY AND ALL INDICATED PROCEDURES (Abdomen)  REPAIR HERNIA INCISIONAL WITH PHASIX MESH (Abdomen)  COMPONENT SEPARATION (Abdomen)    Relevant Problems   ENDO   (+) Acquired hypothyroidism      Morbid obesity  Prediabetic A1c 6 1  Type and screen done    Patient has tracheal stenosis noted on x-ray in care everywhere - I am unable to view the image    We discussed at length the risk of difficult intubation and resorting to a trach as a last effort if we are unable to ventilate and unable to intubate  Risks/benefits and alternatives all discussed with patient  Patient consents to blood products  Pt understands and consents to emergent trach placement if necessary    Intubated 2/2022: Technique used for successful ETT placement: video laryngoscopy   Blade type: NanoSteel   Blade size: #3   Devices/methods used in placement: intubating stylet   Insertion site: oral   ETT size (mm): 7 0   Cormack-Lehane Classification: grade I - full view of glottis   Taped at (cm): 20   Measured from: teeth   Events: atraumatic intubation, dentition as pre-op and breath sounds equal   bilaterally   Bite block: gauze   Placement verified by: auscultation, palpation of cuff and capnometry     Airway not difficult         Physical Exam    Airway    Mallampati score: II  TM Distance: >3 FB  Neck ROM: full     Dental   No notable dental hx     Cardiovascular  Rhythm: regular, Rate: normal,     Pulmonary  Breath sounds clear to auscultation,     Other Findings        Anesthesia Plan  ASA Score- 3     Anesthesia Type- general with ASA Monitors  Additional Monitors:   Airway Plan: ETT  Comment: Risks/benefits and alternatives discussed with patient including possible PONV, sore throat, damage to teeth/lips/gums/esophagus, and possibility of rare anesthetic and surgical emergencies including but not limited to heart attack, stroke, and/or death   All questions were answered          Plan Factors-    Chart reviewed  Existing labs reviewed  Patient summary reviewed  Patient is not a current smoker  Obstructive sleep apnea risk education given perioperatively  Induction- intravenous  Postoperative Plan- Plan for postoperative opioid use  Planned trial extubation    Informed Consent- Anesthetic plan and risks discussed with patient  I personally reviewed this patient with the CRNA  Discussed and agreed on the Anesthesia Plan with the CRNA  Phyllis Sorenson

## 2023-01-27 NOTE — ANESTHESIA PROCEDURE NOTES
Epidural Block    Patient location during procedure: holding area  Start time: 1/27/2023 8:32 AM  Reason for block: post-op pain management  Staffing  Performed: Anesthesiologist   Anesthesiologist: Aide Umana DO  Resident/CRNA: Leighann Lopez CRNA  Preanesthetic Checklist  Completed: patient identified, IV checked, site marked, risks and benefits discussed, surgical consent, monitors and equipment checked, pre-op evaluation and timeout performed  Epidural  Patient position: sitting  Prep: ChloraPrep  Patient monitoring: heart rate, continuous pulse ox and frequent blood pressure checks  Approach: midline  Location: lumbar  Injection technique: VIRAL saline  Needle  Needle type: Tuohy   Needle gauge: 18 G  Catheter size: 20 G  Catheter at skin depth: 15 cm  Catheter securement method: clear occlusive dressing  Test dose: negativefentanyl 50 mcg/mL - Intravenous   50 mcg - 1/27/2023 8:26:00 AM  midazolam (VERSED) 2 mg/2 mL - Intravenous   2 mg - 1/27/2023 8:30:00 AM  Assessment  Number of attempts: 2negative aspiration for CSF, negative aspiration for heme and no paresthesia on injection  patient tolerated the procedure well with no immediate complications

## 2023-01-27 NOTE — OP NOTE
OPERATIVE REPORT  PATIENT NAME: Carlos Olguin    :  1965  MRN: [id number]  Pt Location: [facility] OR ROOM 15    SURGERY DATE: 2023    Surgeon(s) and Role:  Panel 1:     * Alison Berumen MD - Primary     * Vania Guillaume MD - MD Marquis - Assisting  Panel 2:     * Shelia Mensah MD - Primary     * Brendan Peters MD - Naveen Ramírez MD - Assisting    Preop Diagnosis:  PMB (postmenopausal bleeding) [N95 0]  Incisional hernia, without obstruction or gangrene [K43 2]    Post-Op Diagnosis Codes:     * PMB (postmenopausal bleeding) [N95 0]     * Incisional hernia, without obstruction or gangrene [K43 2]    Procedure(s): HYSTERECTOMY TOTAL ABDOMINAL (HILTON)  BILATERAL SALPINGO-OOPHORECTOMY  WITH EXTENSIVE LYSIS OF ADHESIONS  REPAIR HERNIA INCISIONAL WITH PHASIX MESH  COMPONENT SEPARATION    Specimen(s):  ID Type Source Tests Collected by Time Destination   1 : uterus, cervix, b/l tubes and ovaries Tissue Uterus w/Bilateral Ovaries and Fallopian Tubes TISSUE EXAM Alison Berumen MD 2023 12:24 PM        Estimated Blood Loss:   200 mL    Drains:  Urethral Catheter Non-latex 16 Fr  (Active)   Number of days: 0       Anesthesia Type:   General w/ Epidural    Operative Indications:  PMB (postmenopausal bleeding) [N95 0]  Incisional hernia, without obstruction or gangrene [K372]  65-year-old with morbid obesity, BMI of approximately 55 kg/m² with postmenopausal bleeding, benign D&C, large ventral hernia, prior surgery for peritoneal infection  Operative Findings:  1  Exam under anesthesia revealed a gross normal cervix and a mobile uterus  2   On laparotomy, there were extensive adhesions present from the small bowel to the anterior abdominal wall  There were adhesions from the colon to the uterus  Adhesions from the rectum to the posterior wall of the uterus  Adhesions from the ovaries to the bilateral pelvic sidewall    The pelvic anatomy was grossly normal   3   Additional difficulty was incurred during this procedure due to the extensive adhesive disease as well as morbid obesity  A normal abdominal hysterectomy take approximately an hour and a half  This procedure took approximately 2-1/2 hours  The extra 60 minutes was spent lysing adhesions and obtaining appropriate exposure  Complications:   None    Procedure and Technique:  After informed consent was obtained, an epidural was placed  General endotracheal anesthesia was then initiated without incident  She was prepped and draped in the normal sterile fashion in the dorsal supine position  A central line (right IJ) was placed by anesthesia due to lack of peripheral access  A Arnold catheter was inserted  Attention was then turned the abdomen  A midline vertical incision was made from approximately 10 cm below her previous incision towards the xiphoid process  The previous scar including umbilicus was completely resected with an elliptical incision  There was residual fascia identified at the very superior aspect of the incision  Careful dissection was then performed to remove the prior scar from the fascia given multiple hernias  The peritoneal cavity was ultimately entered  Careful dissection was then performed to dissected the small bowel from the anterior abdominal wall enough to begin to allow the fascia to be opened inferiorly  This occurred slowly with dissection then opening of the fascia in succession  The overlying scar was also removed as the fascial incision progressed  Ultimately the entire prior scar tissue was removed  Using this technique, the fascia was able to be opened completely towards the pubic symphysis  The fascia was opened to approximately 3 cm above the pubic symphysis  Kocher clamps were then used to grasp the fascial edges and elevated    The small bowel was then dissected free of the anterior abdominal wall circumferentially until the bilateral gutters were entered  There was a plane present at the gutters  Once that plane was opened, the remainder of the adhesions were lysed relatively easily  There was some dense adhesions present in the left upper quadrant  A small serosal injury was made to the ileum in this location  It was oversewn using 3 interrupted 3-0 Vicryl sutures  Adhesions were lysed in the pelvis as well  The colon was taken down from the anterior abdominal wall and bladder  A Bookwalter retractor was then able to be placed  The colon was then freed from the uterus  The bilateral adnexa were freed from adhesions to the pelvic sidewall and sigmoid mesentery  The anterior and posterior cul-de-sacs were then completely freed  The bowel was then packed away with moist laparotomy sponges  The retroperitoneal space was opened on the left side and the ureter was identified coursing normally within the medial leaf of the broad ligament  The infundibulopelvic ligament was then skeletonized, clamped with Zeppelin clamps transected and secured using 2 free ties of 0 Vicryl suture  In a similar fashion, on the right side, the pararectal space was opened  The ureter was identified and the infundibulopelvic ligament was skeletonized, clamped with Zeppelin clamps transected and secured using 2 free ties of 0 Vicryl suture  Adhesions from the rectum to the posterior aspect of the uterus were then lysed  This allowed better mobility of the uterus  The bilateral round ligaments were cauterized and transected  The vesicovaginal space was developed  The bladder was able be taken down below the level of the external os of the cervix  The bilateral uterine vessels were then skeletonized, clamped with Zeppelin clamps transected and secured using 0 Vicryl suture    The cardinal ligaments were then clamped with Zeppelin clamps transected and secured with 0 Vicryl suture into the level of the external os of the cervix was reached at which point, a circumferential colpotomy was performed to release the uterus, cervix, tubes and ovaries from the upper vagina  The vagina was grasped with Kocher clamps  The bilateral vaginal angles were sutured using 0 Vicryl suture  The anterior and posterior vagina were then reapproximated using figure-of-eight and interrupted 0 Vicryl suture  Hemostasis was found to be excellent  The pelvis was irrigated  There was a small amount of bleeding identified at the bladder serosa where adhesions were lysed  This was cauterized  The pelvis was irrigated again  There is no evidence of active bleeding identified  The procedure was then turned over to Dr Carmelo Ramesh for the extensive hernia repair  Estimate blood loss for this portion of the procedure is approximately 100 mL     I was present for all critical portions of the procedure and A co-surgeon was required because of skills and techniques relevant to speciality    Patient Disposition:  PACU         SIGNATURE: Kulwant Louis MD  DATE: January 27, 2023  TIME: 1:37 PM

## 2023-01-27 NOTE — OP NOTE
OPERATIVE REPORT  PATIENT NAME: Markell Jo    :  1965  MRN: [id number]  Pt Location: [facility] OR ROOM 15    SURGERY DATE: 2023    Surgeon(s) and Role:  Panel 1:     * Rik Ortega MD - Primary     * William Valladares MD - MD Marquis - Assisting  Panel 2:     * Stacey Chapman MD - Primary     * Yamilet Lemus MD - Vida Stevens MD - Assisting    Preop Diagnosis:  PMB (postmenopausal bleeding) [N95 0]  Incisional hernia, without obstruction or gangrene [K43 2]    Post-Op Diagnosis Codes:     * PMB (postmenopausal bleeding) [N95 0]     * Incisional hernia, without obstruction or gangrene [K43 2]    Procedure(s): HYSTERECTOMY TOTAL ABDOMINAL (HILTON)  BILATERAL SALPINGO-OOPHORECTOMY  WITH EXTENSIVE LYSIS OF ADHESIONS  REPAIR HERNIA INCISIONAL WITH PHASIX MESH  COMPONENT SEPARATION    Specimen(s):  ID Type Source Tests Collected by Time Destination   1 : uterus, cervix, b/l tubes and ovaries Tissue Uterus w/Bilateral Ovaries and Fallopian Tubes TISSUE EXAM Rik Ortega MD 2023 1224        Estimated Blood Loss:   300 mL    Drains:  Closed/Suction Drain Right Abdomen Bulb 10 Fr  (Active)   Number of days: 0       Closed/Suction Drain Left Abdomen Bulb 10 Fr  (Active)   Number of days: 0       Urethral Catheter Non-latex 16 Fr   (Active)   Number of days: 0       [REMOVED] NG/OG/Enteral Tube Nasogastric 18 Fr Right nare (Removed)   Number of days: 0       Anesthesia Type:   General w/ Epidural    Operative Indications:  PMB (postmenopausal bleeding) [N95 0]  Incisional hernia, without obstruction or gangrene [K43 2]    Operative Findings:  - 10cm x 10cm ventral hernia defect  - Successful tension-free component separation with rectrorectus placement of a 25cm x 30cm Phasix mesh (trimmed to 02XO x 23YK)    Complications:   None     Procedure and Technique:  After obtaining informed written consent from both the general surgery and OBGYN teams, the patient was brought back to the operating room and underwent a laparotomy with total abdominal hysterectomy and bilateral salpingo-oophorectomy  Please see Dr Kayla Breen operative dictation for this portion of the procedure  Once successfully completed, we were called into the operating room to proceed with the patient's incisional hernia repair  The left/right abdominal walls were inspected and an incision was made on the medial most aspect of the linea alba, splitting the anterior/posterior rectus sheaths  The posterior rectus sheath and peritoneum were fully dissected off of the rectus abdominis muscle and this plane was extended to the outermost aspect of the rectus sheath  This plane was circumferentially developed and, once able to be reapproximated without tension, closed in a running fashion with single-stranded 1 PDS suture with 5mm bites and 5mm advancements  The retrorectus plane was measured at 30cm long x 20cm wide and a 25cm x 30cm Phasix mesh was selected  The mesh was trimmed to 30cm long x 12cm wide and anchored into the rectrorectus space with multiple 1 PDS sutures passed radially through the rectus abdominus with the Omniclose device  Following the confirmation of instrument/sponge/sharp counts, the rectus abdominis and anterior rectus sheath were  from the subcutaneous tissues until both sides were able to be reapproximated without tension  The hernia sac/fascial defect were completely excised, the anterior sheath was closed with numerous 1 PDS sutures in a figure-of-eight fashion, the wound was irrigated, and hemostasis was confirmed  Two 19Fr flat MARCO ANTONIO drains were then placed into the subcutaneous tissues superficial to the anterior rectus sheath through separate stab incisions in the right and left lower quadrants  Both drains were sutured in place with 3-0 nylons and the subcutaneous tissues were reapproximated with 2-0 Vicryl in order to obliterate the subcutaneous space  The skin was reapproximated with staples, gauze/Tegaderm was placed as a dressing, and the patient was uneventfully extubated and transported to the PACU in stable condition with no immediate complications noted  Dr Juan Manuel Mccurdy was present for all critical portions of this procedure      Patient Disposition:  PACU     SIGNATURE: Sarmad Roblero MD  DATE: January 27, 2023  TIME: 4:10 PM

## 2023-01-27 NOTE — DISCHARGE SUMMARY
Discharge Summary   Letitia Jack MRN: [id number]  Unit/Bed#: [facility] 830-01 Encounter: [id number]      Admission Date: 1/27/2023     Discharge Date: 2/2/2023    Attending: Chavo Lee,*   Discharge Attending: Emilia Garland    Principal Diagnosis: PMB (postmenopausal bleeding) [N95 0]  Incisional hernia, without obstruction or gangrene [K43 2]    Procedures: Total abdominal hysterectomy, bilateral salpingo-oophorectomy, ventral hernia repair    Hospital course: Letitia Jack 59yo admitted post-operatively after a scheduled HILTON, BSO, and ventral hernia repair  She has a history of COVID/ARDS requiring intubation, tracheostomy, and PEG tube placement complicated by gastric perforation requiring subsequent exploratory laparotomy  She received an epidural for analgesia and had a RIJ central line placed due to difficult access  She had two MARCO ANTONIO drains placed in the R+LLQ after the procedure  Epidural and koo catheter were removed on 1/30  Central line remained in place until 2/2  On day of discharge, patient was reasonably completing ADLs and was found to be suitable for discharge  MARCO ANTONIO drains will remain in place until follow-up appointment in two weeks      Lab Results:   Lab Results   Component Value Date    WBC 9 21 02/02/2023    HGB 10 1 (L) 02/02/2023    HCT 34 4 (L) 02/02/2023    MCV 82 02/02/2023     02/02/2023     Lab Results   Component Value Date    CALCIUM 9 1 02/01/2023    K 3 8 02/01/2023    CO2 27 02/01/2023     02/01/2023    BUN 10 02/01/2023    CREATININE 0 56 (L) 02/01/2023     Lab Results   Component Value Date/Time    POCGLU 136 01/27/2023 04:19 PM    POCGLU 134 01/27/2023 04:08 PM    POCGLU 89 01/27/2023 10:09 AM     No results found for: PTT  Lab Results   Component Value Date    INR 1 0 10/11/2022    PROTIME 10 4 55/60/0251       Complications: none apparent    Condition at discharge: stable     Discharge instructions/Information to patient and family:   See After Visit Summary for information provided to patient and family  Provisions for Follow-Up Care:  See After Visit Summary for information related to follow-up care and any pertinent home health orders  Disposition: See After Visit Summary for discharge disposition information  Planned Readmission: Yes, pending clinical status    Discharge Medications: For a complete list of the patient's medications, please refer to her med rec      Donal Mariscal MD  2/2/2023  6:45 AM

## 2023-01-27 NOTE — ANESTHESIA PROCEDURE NOTES
Central Line Insertion  Performed by: Juancarlos Araya DO  Authorized by: Juancarlos Araya DO     Date/Time: 1/27/2023 10:20 AM  Catheter Type:  triple lumen  Consent: The procedure was performed in an emergent situation  Verbal consent not obtained  Written consent not obtained  Required items: required blood products, implants, devices, and special equipment available  Patient identity confirmed: arm band  Indications: vascular access  Catheter size: 7 Fr  Patient position: Trendelenburg    Sedation:  Patient sedated: yes  Sedatives: see MAR for details  Analgesia: see MAR for details    Assessment: blood return through all ports  Preparation: skin prepped with 2% chlorhexidine  Skin prep agent dried: skin prep agent completely dried prior to procedure  Sterile barriers: all five maximum sterile barriers used - cap, mask, sterile gown, sterile gloves, and large sterile sheet  Hand hygiene: hand hygiene performed prior to central venous catheter insertion  sterile gel and probe cover used in ultrasound-guided central venous catheter insertionPre-procedure: landmarks identified  Number of attempts: 1  Successful placement: yes  Post-procedure: chlorhexidine patch applied, line sutured and dressing applied  Patient tolerance: patient tolerated the procedure well with no immediate complications  Comments: No consent as patient was already under general anesthesia and she had no IV access despite multiple attempts by multiple providers  Induction was achieved with a working IV, however due to the need to start phenylephrine and inability to place an additional larger bore IV decision made to place central line  Discussed with Dr Drew Colon in room prior to placement

## 2023-01-27 NOTE — DISCHARGE INSTRUCTIONS
Discharge Instructions    WHAT YOU NEED TO KNOW:     DISCHARGE INSTRUCTIONS:   Contact your doctor at the number above if:   You have a fever over 101o  You have nausea or are vomiting that does not improve after a light meal    Your pain is getting worse, even after you take medicine  You feel pain or burning when you urinate, or you have trouble urinating  You have pus or a foul-smelling odor coming from your vagina and/or wound  Your wound is red, swollen, or draining pus  You see new or an increased amount of bright red blood coming from your vagina or your incisions  You have questions or concerns about your condition or care  Seek care immediately:   Your arm or leg feels warm, tender, and painful  It may look swollen and red  You have increasing abdominal or pelvic pain  You have heavy vaginal bleeding that fills 1 or more sanitary pads in 1 hour  Call 911 for any of the following: You feel lightheaded, short of breath, and have chest pain  You cough up blood  Medicines: You may need any of the following:  Resume your previous medications as directed  Extra-strength Tylenol: This medications over-the-counter  Take 2 tablets every 6 hours as needed for mild-to-moderate pain  Ibuprofen/Advil/Motrin 200 mg tablets: This medication is over-the-counter  Take 3 tablets every 8 hours as needed for moderate to severe pain  Take this medication with food  The drink plenty of fluids while using this medication to prevent kidney injury  Metamucil or MiraLax: This product is over-the-counter  Distal 1 large tbsp in a large glass of water  Use once or twice a day for 1-2 weeks prevent and or treat constipation  Take your medicines as directed  Contact your healthcare provider if you think your medicine is not helping or if you have side effects  Tell him or her if you are allergic to any medicine  Keep a list of the medicines, vitamins, and herbs you take   Include the amounts, and when and why you take them  Bring the list or the pill bottles to follow-up visits  Carry your medicine list with you in case of an emergency  Activity:   Rest as needed  Get up and move around as directed to help prevent blood clots  Start with short walks and slowly increase the distance every day  Limit the number of times you climb stairs to 2 times each day for the first week  Plan most of your daily activities on one level of your home  Do not lift objects heavier than 10 pounds until seen in the office for your follow-up appointment  Do not strain during bowel movements  High-fiber foods and extra liquids can help you prevent constipation  Examples of high-fiber foods are fruit and bran  Prune juice and water are good liquids to drink  Do not have sex, use tampons, or douche and do not do tub baths/swimming until cleared by your physician at your postoperative appointment  You may shower as soon as the day after surgery  Do not go into pools or hot tubs until cleared by your doctor  Ask when it is safe for you to drive  It is generally safe to drive as soon as your legs are strong, you are off pain medicines and you feel like you will have the appropriate reflexes to step on the breaks in case of an emergency  Ask when you may return to work and to other regular activities  Wound care: Care for your abdominal incisions as directed  Carefully wash around the wound with soap and water  If you have Hibiclens or medicated soap that you were instructed to use before surgery, you may use that to wash with for up to 2 days after surgery  If not, any mild non-scented, non-abrasive soap is safe  It is okay to let the soap and water run over your incision  Do not scrub your incision  Dry the area and put on new, clean bandages as directed  Change your bandages when they get wet or dirty  If you have strips of medical tape, let them fall off on their own   It may take 7 to 14 days for them to fall off  Check your incision every day for redness, swelling, or pus  Deep breathing: Take deep breaths and cough 10 times each hour  This will decrease your risk for a lung infection  Take a deep breath and hold it for as long as you can  Let the air out and then cough strongly  Deep breaths help open your airway  You may be given an incentive spirometer to help you take deep breaths  Put the plastic piece in your mouth and take a slow, deep breath, then let the air out and cough  Repeat these steps 10 times every hour  Get support: This surgery may be life-changing for you and your family  You will no longer be able to get pregnant  Sudden changes in the levels of your hormones may occur and cause mood swings and depression  You may feel angry, sad, or frightened, or cry frequently and unexpectedly  These feelings are normal  Talk to your healthcare provider about where you can get support  You can also ask if hormone replacement medicine is right for you  Follow up with your healthcare provider or gynecologist as directed: You may need to return to have stitches removed, and for other tests  Write down your questions so you remember to ask them during your visits  © 2017 [employer] Information is for End User's use only and may not be sold, redistributed or otherwise used for commercial purposes  All illustrations and images included in CareNotes® are the copyrighted property of [employer] , [employer]  or Chang Barnett  The above information is an  only  It is not intended as medical advice for individual conditions or treatments  Talk to your doctor, nurse or pharmacist before following any medical regimen to see if it is safe and effective for you  Postoperative Opioid Use  You may have been prescribed an opioid pain medication to assist with your postoperative pain control   Our goal is for your pain to be controlled, not for you to be completely pain free  Being pain free after surgery takes time  While this medication is excellent at treating postoperative pain, it has several side effects and addictive properties  Here is a suggested plan to minimize opiate use:    Use non-opiate methods of pain control first  Use ice packs over your incision to reduce pain and swelling  This will help minimize the need for medications  For the first few days after your surgery, take 650 mg of Acetaminophen (Tylenol) every 6 hours regularly  In addition, take 600 mg of Ibuprofen (Motrin) every 6 hours regularly  Using these medications will help keep you from getting into severe pain and can reduce how much narcotic pain medication you need even if they are not enough to control your pain  Use opiate medication when the other methods are not adequate  For the first few days after surgery, you may need additional pain relief   You may take your opiate medication every 4-6 hours  This is the first medication you should stop taking as your pain improves  After the first few days, you should not require opiate medications  You may continue to take 650 mg of Acetaminophen (Tylenol) every 6 hours regularly  You may take Ibuprofen (Motrin) 600 mg as needed for additional pain relief  If you have unused tablets of the opiate medication, see below  Do not take more than 4,000 mg of Acetaminophen (Tylenol) in a 24 hour period  Please ask your doctor for guidance on amount of Acetaminophen (Tylenol) it is safe to take if you have known liver disease  If you have a sensitive stomach, please take Ibuprofen (Motrin) with food  Please ask your doctor for guidance on safety of Ibuprofen (Motrin) if you have known kidney disease or if you have a history of weight loss surgery      Side effects of opioids  Do not drive or operate heavy machinery while using opioid pain medications   A few days of postoperative opioid use is safe while breastfeeding   Opioids can cause constipation  Please be sure to drink lots of water, and your doctor may recommend use of a stool softener, such as docusate sodium (Colace) 100 mg twice a day or Senna 8 6 mg every night  Disposing of your unused pills  It is very likely that you will not need all of the pills you have been prescribed  They should not be used to treat other pain  They should not be shared with other people  They should not be saved  It is not safe to take  medications  It is not safe to keep them around the house  It is not safe to keep them within reach of children or pets  You should dispose of unused pills by taking them to a Controlled Substance Disposal Site  Controlled Substance Disposal Sites   The following locations are controlled substance disposal sites  You may walk in to any location with unused pills and dispose of them safely and anonymously  Pharmacies that accept unused pills  [facility]  [address] [place] [address], [address] [place] [address] [facility] [address] [place] [address] [address] [place] [address] [place] [address] [address]   [address] [place] [address]  Please call your local pharmacy to see if they also have a Controlled Substance Disposal location and are not on this list     Police departments may accept unused pills  Most local police departments have a drop box on their premises  Please contact your local police department for more information  Information can also be found here: https://safe  pharmacy/drug-disposal/ [safe  pharmacy]    National Prescription Drug Take Back Day  There is a biannual National Prescription Drug Take Back Day, usually in April and October, when there are more sites available than usual, including most [facility]   Information for that can be found here: https://takebackday peter gov/ [[facility]back[facility] gov]  You can find more information about prescription disposal here:   https://safe  pharmacy/drug-disposal/ [safe  pharmacy]   [employer]  aspx [ddap pa gov]    Thank you

## 2023-01-27 NOTE — H&P
Assessment/Plan:  58-year-old with morbid obesity, BMI 55 kg/m², history of COVID/ARDS requiring intubation, tracheostomy, PEG tube placement which was complicated by gastric perforation requiring exploratory laparotomy  Her midline incision healed by secondary intention  She is a large incisional hernia  She has tracheal scarring and has been recommended for short segment tracheal resection  She has postmenopausal bleeding, atypical glandular cell Pap smear, negative D&C  Postmenopausal bleeding has been controlled using palliative Megace  Her performance status is 0   1   Plan for total abdominal hysterectomy, bilateral salpingo-oophorectomy, ventral hernia repair  CHIEF COMPLAINT: Here for surgery        Patient ID: Neville Post is a 60 y o  female  Presents for surgery  There has been no interval change in medications medical history since her last visit to the office  She is not having postmenopausal bleeding  No complaints today  Review of Systems   Constitutional: Negative for activity change and unexpected weight change  HENT: Negative  Eyes: Negative  Respiratory: Negative  Cardiovascular: Negative  Gastrointestinal: Negative for abdominal distention and abdominal pain  Endocrine: Negative  Genitourinary: Negative for pelvic pain and vaginal bleeding  Musculoskeletal: Negative  Skin: Negative  Allergic/Immunologic: Negative  Neurological: Negative  Hematological: Negative  Psychiatric/Behavioral: The patient is nervous/anxious          Current Facility-Administered Medications   Medication Dose Route Frequency Provider Last Rate Last Admin   • acetaminophen (TYLENOL) tablet 975 mg  975 mg Oral Once Charley Morris MD       • ceFAZolin (ANCEF) IVPB (premix in dextrose) 2,000 mg 50 mL  2,000 mg Intravenous Once Charley Morris MD       • gabapentin (NEURONTIN) capsule 200 mg  200 mg Oral Once Charley Morris MD       • heparin (porcine) subcutaneous injection 5,000 Units  5,000 Units Subcutaneous On Call To [address] [facility] [address], MD       • lactated ringers infusion  125 mL/hr Intravenous Continuous Carmen Rutherford MD           No Known Allergies    Past Medical History:   Diagnosis Date   • Acute hypoxemic respiratory failure due to COVID-19 [facility])     trached-trachael damage-resection  damage to liver feeding tube  renal failure-dialysis   • History of renal failure     s/p sepsis and severe covid, was on dialysis   • History of transfusion     Pt unsure but thinks they gave her blood   • Primary hypertension        Past Surgical History:   Procedure Laterality Date   • CARPAL TUNNEL RELEASE     • EXPLORATORY LAPAROTOMY     • PEG TUBE PLACEMENT      and removed   • OK HYSTEROSCOPY BX ENDOMETRIUM&/POLYPC W/WO D&C N/A 10/17/2022    Procedure: DILATATION AND CURETTAGE (D&C) WITH HYSTEROSCOPY;  Surgeon: Carmen Rutherford MD;  Location: [facility];  Service: Gynecology Oncology   • REVISION TRACHEOSTOMY SCAR     • TRACHEOSTOMY     • TUMOR REMOVAL Left     upper arm at age 11, benign       OB History        0    Para   0    Term   0       0    AB   0    Living   0       SAB   0    IAB   0    Ectopic   0    Multiple   0    Live Births   0                 Family History   Problem Relation Age of Onset   • Cancer Mother    • Cancer Father        The following portions of the patient's history were reviewed and updated as appropriate: allergies, current medications, past family history, past medical history, past social history, past surgical history and problem list       Objective: There were no vitals taken for this visit  There is no height or weight on file to calculate BMI  Physical Exam  Vitals reviewed  Constitutional:       General: She is not in acute distress  Appearance: Normal appearance  She is obese  She is not ill-appearing, toxic-appearing or diaphoretic     HENT:      Head: Normocephalic and atraumatic  Mouth/Throat:      Mouth: Mucous membranes are moist    Eyes:      General: No scleral icterus  Right eye: No discharge  Left eye: No discharge  Conjunctiva/sclera: Conjunctivae normal    Cardiovascular:      Rate and Rhythm: Normal rate and regular rhythm  Heart sounds: Normal heart sounds  Pulmonary:      Effort: Pulmonary effort is normal       Breath sounds: Normal breath sounds  Abdominal:      Palpations: Abdomen is soft  Musculoskeletal:      Right lower leg: No edema  Left lower leg: No edema  Skin:     General: Skin is warm and dry  Coloration: Skin is not jaundiced  Findings: No rash  Neurological:      General: No focal deficit present  Mental Status: She is alert and oriented to person, place, and time  Cranial Nerves: No cranial nerve deficit  Motor: No weakness  Gait: Gait normal    Psychiatric:         Mood and Affect: Mood normal          Behavior: Behavior normal          Thought Content:  Thought content normal          Judgment: Judgment normal

## 2023-01-27 NOTE — ANESTHESIA POSTPROCEDURE EVALUATION
Post-Op Assessment Note    CV Status:  Stable  Pain Score: 2    Pain management: adequate  Multimodal analgesia used between 6 hours prior to anesthesia start to PACU discharge    Mental Status:  Alert and awake   Hydration Status:  Euvolemic   PONV Controlled:  Controlled   Airway Patency:  Patent      Post Op Vitals Reviewed: Yes      Staff: Anesthesiologist, CRNA         No notable events documented      /55 (01/27/23 1608)    Temp 98 7 °F (37 1 °C) (01/27/23 1608)    Pulse 74 (01/27/23 1608)   Resp 14 (01/27/23 1608)    SpO2 97 % (01/27/23 1608)

## 2023-01-28 LAB
ANION GAP SERPL CALCULATED.3IONS-SCNC: 6 MMOL/L (ref 4–13)
BUN SERPL-MCNC: 14 MG/DL (ref 5–25)
CALCIUM SERPL-MCNC: 8.3 MG/DL (ref 8.3–10.1)
CHLORIDE SERPL-SCNC: 106 MMOL/L (ref 96–108)
CO2 SERPL-SCNC: 24 MMOL/L (ref 21–32)
CREAT SERPL-MCNC: 1.1 MG/DL (ref 0.6–1.3)
ERYTHROCYTE [DISTWIDTH] IN BLOOD BY AUTOMATED COUNT: 15.9 % (ref 11.6–15.1)
GFR SERPL CREATININE-BSD FRML MDRD: 55 ML/MIN/1.73SQ M
GLUCOSE SERPL-MCNC: 131 MG/DL (ref 65–140)
HCT VFR BLD AUTO: 36.7 % (ref 34.8–46.1)
HGB BLD-MCNC: 10.5 G/DL (ref 11.5–15.4)
MAGNESIUM SERPL-MCNC: 2 MG/DL (ref 1.6–2.6)
MCH RBC QN AUTO: 23.5 PG (ref 26.8–34.3)
MCHC RBC AUTO-ENTMCNC: 28.6 G/DL (ref 31.4–37.4)
MCV RBC AUTO: 82 FL (ref 82–98)
PLATELET # BLD AUTO: 317 THOUSANDS/UL (ref 149–390)
PMV BLD AUTO: 9.8 FL (ref 8.9–12.7)
POTASSIUM SERPL-SCNC: 4.5 MMOL/L (ref 3.5–5.3)
RBC # BLD AUTO: 4.46 MILLION/UL (ref 3.81–5.12)
SODIUM SERPL-SCNC: 136 MMOL/L (ref 135–147)
WBC # BLD AUTO: 15.86 THOUSAND/UL (ref 4.31–10.16)

## 2023-01-28 RX ORDER — DIPHENHYDRAMINE HYDROCHLORIDE 50 MG/ML
25 INJECTION INTRAMUSCULAR; INTRAVENOUS EVERY 6 HOURS PRN
Status: DISCONTINUED | OUTPATIENT
Start: 2023-01-28 | End: 2023-02-02 | Stop reason: HOSPADM

## 2023-01-28 RX ORDER — ONDANSETRON 2 MG/ML
4 INJECTION INTRAMUSCULAR; INTRAVENOUS EVERY 4 HOURS PRN
Status: DISCONTINUED | OUTPATIENT
Start: 2023-01-28 | End: 2023-02-02 | Stop reason: HOSPADM

## 2023-01-28 RX ORDER — HEPARIN SODIUM 5000 [USP'U]/ML
5000 INJECTION, SOLUTION INTRAVENOUS; SUBCUTANEOUS EVERY 8 HOURS SCHEDULED
Status: COMPLETED | OUTPATIENT
Start: 2023-01-28 | End: 2023-01-29

## 2023-01-28 RX ORDER — METOCLOPRAMIDE HYDROCHLORIDE 5 MG/ML
5 INJECTION INTRAMUSCULAR; INTRAVENOUS EVERY 6 HOURS PRN
Status: DISCONTINUED | OUTPATIENT
Start: 2023-01-28 | End: 2023-01-30

## 2023-01-28 RX ADMIN — OXYCODONE HYDROCHLORIDE 5 MG: 5 TABLET ORAL at 09:50

## 2023-01-28 RX ADMIN — ROPIVACAINE HYDROCHLORIDE: 2 INJECTION, SOLUTION EPIDURAL; INFILTRATION at 21:51

## 2023-01-28 RX ADMIN — HEPARIN SODIUM 7500 UNITS: 5000 INJECTION INTRAVENOUS; SUBCUTANEOUS at 05:09

## 2023-01-28 RX ADMIN — LEVOTHYROXINE SODIUM 100 MCG: 100 TABLET ORAL at 08:22

## 2023-01-28 RX ADMIN — OXYCODONE HYDROCHLORIDE 10 MG: 10 TABLET ORAL at 17:29

## 2023-01-28 RX ADMIN — DEXTROSE, SODIUM CHLORIDE, AND POTASSIUM CHLORIDE 125 ML/HR: 5; .45; .15 INJECTION INTRAVENOUS at 16:13

## 2023-01-28 RX ADMIN — LIOTHYRONINE SODIUM 5 MCG: 5 TABLET ORAL at 08:23

## 2023-01-28 RX ADMIN — ACETAMINOPHEN 975 MG: 325 TABLET ORAL at 13:03

## 2023-01-28 RX ADMIN — OXYCODONE HYDROCHLORIDE 5 MG: 5 TABLET ORAL at 00:41

## 2023-01-28 RX ADMIN — DOCUSATE SODIUM 100 MG: 100 CAPSULE, LIQUID FILLED ORAL at 08:23

## 2023-01-28 RX ADMIN — ROPIVACAINE HYDROCHLORIDE: 2 INJECTION, SOLUTION EPIDURAL; INFILTRATION at 03:14

## 2023-01-28 RX ADMIN — OXYCODONE HYDROCHLORIDE 10 MG: 10 TABLET ORAL at 05:10

## 2023-01-28 RX ADMIN — SODIUM CHLORIDE, SODIUM LACTATE, POTASSIUM CHLORIDE, AND CALCIUM CHLORIDE 500 ML: .6; .31; .03; .02 INJECTION, SOLUTION INTRAVENOUS at 05:16

## 2023-01-28 RX ADMIN — HEPARIN SODIUM 5000 UNITS: 5000 INJECTION INTRAVENOUS; SUBCUTANEOUS at 21:40

## 2023-01-28 RX ADMIN — OXYCODONE HYDROCHLORIDE 10 MG: 10 TABLET ORAL at 21:40

## 2023-01-28 RX ADMIN — ACETAMINOPHEN 975 MG: 325 TABLET ORAL at 05:10

## 2023-01-28 RX ADMIN — ACETAMINOPHEN 975 MG: 325 TABLET ORAL at 21:39

## 2023-01-28 RX ADMIN — HEPARIN SODIUM 5000 UNITS: 5000 INJECTION INTRAVENOUS; SUBCUTANEOUS at 13:03

## 2023-01-28 RX ADMIN — DEXTROSE, SODIUM CHLORIDE, AND POTASSIUM CHLORIDE 125 ML/HR: 5; .45; .15 INJECTION INTRAVENOUS at 00:39

## 2023-01-28 NOTE — QUICK NOTE
Quick note    Called to evaluate patient because her blood pressure has been lower than usual   Earlier with 108/58 , now is 94/58  Blood pressure typically in the 140's to 150s  Also,  because of patient's body habitus, there has been difficulty getting the SCDs to function properly without alarming  Patient states she feels well except for when she moves which causes severe right-sided abdominal pain and right sided back pain  She has an epidural in place and is receiving oxycodone for breakthrough pain  She denies chest pain, shortness of breath, nausea and vomiting  /70 (BP Location: Right arm)   Pulse 102   Temp 97 5 °F (36 4 °C)   Resp 18   Ht 5' 2" (1 575 m)   Wt (!) 138 kg (305 lb)   SpO2 95%   BMI 55 79 kg/m²    On examination patient is in no acute distress  She appears comfortable  Heart: Regular at 92  Chest: Clear except for late expiratory wheezes  Abdomen: Appropriate tenderness, soft    Impression: 53-year-old female status post total abdominal hysterectomy, BSO, extensive lysis of adhesions, repair of incisional hernia with component separation for postmenopausal bleeding and incisional hernia with stoppage of hypotension  Patient did receive her usual dose of metoprolol at 2300 hrs  We will give patient IV bolus of 500 mL LR now and reevaluate later this morning      Yisel Virk PA-C

## 2023-01-28 NOTE — PROGRESS NOTES
Progress Note    Bridger Guadarrama 60 y o  female MRN: [id number]  Unit/Bed#: [facility] 26-0 Encounter: [id number]    Assessment:  61-yr old F w/ post-menopausal bleeding; now s/p Ex-lap, HILTON/BSO, abdominal wall reconstruction w/ component separation/sub-lay mesh 1/27  AVSS/afebrile  On 2 L NC  L MARCO ANTONIO-40 cc/ss  R MARCO ANTONIO- 100 cc/ss    PLAN  - ok to advance diet; regular diet  - multi-modal analgesia  - keep MARCO ANTONIO to bulb suction  - abdo binder   - OOB, ambulate, IS   - other care per primary team     Subjective:   - no new complaints this morning   - pain well controlled on epidural/PRN dilaudid  Objective:     Vitals: Temp:  [97 5 °F (36 4 °C)-99 °F (37 2 °C)] 97 9 °F (36 6 °C)  HR:  [] 87  Resp:  [14-22] 20  BP: ()/(46-85) 99/46  Body mass index is 55 79 kg/m²  I/O       01/26 0701  01/27 0700 01/27 0701  01/28 0700 01/28 0701 01/29 0700    I V  (mL/kg)  6877 1 (49 8)     Total Intake(mL/kg)  6877 1 (49 8)     Urine (mL/kg/hr)  1580 (0 5)     Drains  140     Blood  300     Total Output  2020     Net  +4857 1                  Physical Exam:  GEN: sitting up in bed; not in any painful distress  HEENT: atraumatic  CV: RRR  Lung: Normal effort  Ab: obese, soft, appro tender  B/L MARCO ANTONIO drains in situ  Extrem: No CCE  Neuro: A+Ox3    Lab, Imaging and other studies:   I have personally reviewed pertinent reports    , CBC with diff:   Lab Results   Component Value Date    WBC 15 86 (H) 01/28/2023    HGB 10 5 (L) 01/28/2023    HCT 36 7 01/28/2023    MCV 82 01/28/2023     01/28/2023    MCH 23 5 (L) 01/28/2023    MCHC 28 6 (L) 01/28/2023    RDW 15 9 (H) 01/28/2023    MPV 9 8 01/28/2023   , BMP/CMP:   Lab Results   Component Value Date    SODIUM 136 01/28/2023    K 4 5 01/28/2023     01/28/2023    CO2 24 01/28/2023    BUN 14 01/28/2023    CREATININE 1 10 01/28/2023    CALCIUM 8 3 01/28/2023    EGFR 55 01/28/2023     VTE Pharmacologic Prophylaxis: Heparin  VTE Mechanical Prophylaxis: sequential compression device

## 2023-01-28 NOTE — ASSESSMENT & PLAN NOTE
- FEN/GI: advance to regular diet  - NEURO/PAIN: PCEA in place, oxy PRN  - CV: hold home metoprolol in setting of hypotension, appreciate APS recs re: PCEA  - HEME: daily labs   - : appropriate urine output, maintain Arnold while PCEA in place  - ENDO: continue home meds for hypothyroidism  - PPx: SQH 5000u TID, OOB, ICS   - Lines: R IJ (1/27- ), PCEA (1/27- )

## 2023-01-28 NOTE — QUICK NOTE
Post op check - Surgery  Tessie Gleason 60 y o  female MRN: [id number]  Unit/Bed#: [facility] 830-01 Encounter: [id number]    Assessment:  60 y o  female now Day of Surgery s/p Procedure(s) (LRB):  HYSTERECTOMY TOTAL ABDOMINAL (HILTON), BILATERAL SALPINGO-OOPHORECTOMY, WITH EXTENSIVE LYSIS OF ADHESIONS (N/A)  REPAIR HERNIA INCISIONAL WITH PHASIX MESH (N/A)  COMPONENT SEPARATION (N/A)     Hemodynamically stable  serosang MARCO ANTONIO effluent  Plan:  - Diet Clear Liquid  - Pain and Nausea control PRN  - Incentive spirometry  - OOB, ambulate    Subjective/Objective     Subjective: michael-incisional abdominal pain  Objective:   Vitals: Blood pressure 144/69, pulse 86, temperature 98 2 °F (36 8 °C), resp  rate 14, height 5' 2" (1 575 m), weight (!) 138 kg (305 lb), SpO2 96 %  ,Body mass index is 55 79 kg/m²  I/O       01/26 0701 01/27 0700 01/27 0701 01/28 0700    I V  (mL/kg)  6275 (45 5)    Total Intake(mL/kg)  6275 (45 5)    Urine (mL/kg/hr)  1305 (0 7)    Drains  60    Blood  300    Total Output  1665    Net  +4610                Physical Exam:  Gen: NAD, Aox3, Comfortable in bed  Chest: Normal work of breathing, no respiratory distress  Abd: Soft, ND, appro tender  MARCO ANTONIO w/ serosanguinous effluent  Ext: No Edema  Skin: Warm, Dry, Intact      Lab, Imaging and other studies: I have personally reviewed pertinent reports    , CBC with diff: No results found for: WBC, HGB, HCT, MCV, PLT, ADJUSTEDWBC, MCH, MCHC, RDW, MPV, NRBC, BMP/CMP: No results found for: SODIUM, K, CL, CO2, ANIONGAP, BUN, CREATININE, GLUCOSE, CALCIUM, AST, ALT, ALKPHOS, PROT, BILITOT, EGFR  VTE Pharmacologic Prophylaxis: Heparin  VTE Mechanical Prophylaxis: sequential compression device

## 2023-01-28 NOTE — PLAN OF CARE
Problem: Prexisting or High Potential for Compromised Skin Integrity  Goal: Skin integrity is maintained or improved  Description: INTERVENTIONS:  - Identify patients at risk for skin breakdown  - Assess and monitor skin integrity  - Assess and monitor nutrition and hydration status  - Monitor labs   - Assess for incontinence   - Turn and reposition patient  - Assist with mobility/ambulation  - Relieve pressure over bony prominences  - Avoid friction and shearing  - Provide appropriate hygiene as needed including keeping skin clean and dry  - Evaluate need for skin moisturizer/barrier cream  - Collaborate with interdisciplinary team   - Patient/family teaching  - Consider wound care consult   Outcome: Progressing     Problem: MOBILITY - ADULT  Goal: Maintain or return to baseline ADL function  Description: INTERVENTIONS:  -  Assess patient's ability to carry out ADLs; assess patient's baseline for ADL function and identify physical deficits which impact ability to perform ADLs (bathing, care of mouth/teeth, toileting, grooming, dressing, etc )  - Assess/evaluate cause of self-care deficits   - Assess range of motion  - Assess patient's mobility; develop plan if impaired  - Assess patient's need for assistive devices and provide as appropriate  - Encourage maximum independence but intervene and supervise when necessary  - Involve family in performance of ADLs  - Assess for home care needs following discharge   - Consider OT consult to assist with ADL evaluation and planning for discharge  - Provide patient education as appropriate  Outcome: Progressing  Goal: Maintains/Returns to pre admission functional level  Description: INTERVENTIONS:  - Perform BMAT or MOVE assessment daily    - Set and communicate daily mobility goal to care team and patient/family/caregiver  - Collaborate with rehabilitation services on mobility goals if consulted  - Perform Range of Motion 3 times a day    - Reposition patient every 2 hours   - Dangle patient 3 times a day  - Stand patient 3 times a day  - Ambulate patient 3 times a day  - Out of bed to chair 3 times a day   - Out of bed for meals 3 times a day  - Out of bed for toileting  - Record patient progress and toleration of activity level   Outcome: Progressing     Problem: PAIN - ADULT  Goal: Verbalizes/displays adequate comfort level or baseline comfort level  Description: Interventions:  - Encourage patient to monitor pain and request assistance  - Assess pain using appropriate pain scale  - Administer analgesics based on type and severity of pain and evaluate response  - Implement non-pharmacological measures as appropriate and evaluate response  - Consider cultural and social influences on pain and pain management  - Notify physician/advanced practitioner if interventions unsuccessful or patient reports new pain  Outcome: Progressing     Problem: INFECTION - ADULT  Goal: Absence or prevention of progression during hospitalization  Description: INTERVENTIONS:  - Assess and monitor for signs and symptoms of infection  - Monitor lab/diagnostic results  - Monitor all insertion sites, i e  indwelling lines, tubes, and drains  - Monitor endotracheal if appropriate and nasal secretions for changes in amount and color  - [place] appropriate cooling/warming therapies per order  - Administer medications as ordered  - Instruct and encourage patient and family to use good hand hygiene technique  - Identify and instruct in appropriate isolation precautions for identified infection/condition  Outcome: Progressing  Goal: Absence of fever/infection during neutropenic period  Description: INTERVENTIONS:  - Monitor WBC    Outcome: Progressing     Problem: SAFETY ADULT  Goal: Maintain or return to baseline ADL function  Description: INTERVENTIONS:  -  Assess patient's ability to carry out ADLs; assess patient's baseline for ADL function and identify physical deficits which impact ability to perform ADLs (bathing, care of mouth/teeth, toileting, grooming, dressing, etc )  - Assess/evaluate cause of self-care deficits   - Assess range of motion  - Assess patient's mobility; develop plan if impaired  - Assess patient's need for assistive devices and provide as appropriate  - Encourage maximum independence but intervene and supervise when necessary  - Involve family in performance of ADLs  - Assess for home care needs following discharge   - Consider OT consult to assist with ADL evaluation and planning for discharge  - Provide patient education as appropriate  Outcome: Progressing  Goal: Maintains/Returns to pre admission functional level  Description: INTERVENTIONS:  - Perform BMAT or MOVE assessment daily    - Set and communicate daily mobility goal to care team and patient/family/caregiver  - Collaborate with rehabilitation services on mobility goals if consulted  - Perform Range of Motion 3 times a day  - Reposition patient every 2 hours    - Dangle patient 3 times a day  - Stand patient 3 times a day  - Ambulate patient 3 times a day  - Out of bed to chair 3 times a day   - Out of bed for meals 3 times a day  - Out of bed for toileting  - Record patient progress and toleration of activity level   Outcome: Progressing  Goal: Patient will remain free of falls  Description: INTERVENTIONS:  - Educate patient/family on patient safety including physical limitations  - Instruct patient to call for assistance with activity   - Consult OT/PT to assist with strengthening/mobility   - Keep Call bell within reach  - Keep bed low and locked with side rails adjusted as appropriate  - Keep care items and personal belongings within reach  - Initiate and maintain comfort rounds  - Make Fall Risk Sign visible to staff  - Offer Toileting every Hours, in advance of need  - Initiate/Maintainalarm  - Obtain necessary fall risk management equipment:   - Apply yellow socks and bracelet for high fall risk patients  - Consider moving patient to room near nurses station  Outcome: Progressing     Problem: DISCHARGE PLANNING  Goal: Discharge to home or other facility with appropriate resources  Description: INTERVENTIONS:  - Identify barriers to discharge w/patient and caregiver  - Arrange for needed discharge resources and transportation as appropriate  - Identify discharge learning needs (meds, wound care, etc )  - Arrange for interpretive services to assist at discharge as needed  - Refer to Case Management Department for coordinating discharge planning if the patient needs post-hospital services based on physician/advanced practitioner order or complex needs related to functional status, cognitive ability, or social support system  Outcome: Progressing     Problem: Knowledge Deficit  Goal: Patient/family/caregiver demonstrates understanding of disease process, treatment plan, medications, and discharge instructions  Description: Complete learning assessment and assess knowledge base    Interventions:  - Provide teaching at level of understanding  - Provide teaching via preferred learning methods  Outcome: Progressing

## 2023-01-28 NOTE — PROGRESS NOTES
For questions/concerns on this patient, please reach out to the following:  [facility]-OB GYN-GynOnc- Resident/AP  Gyn Oncology Progress note   Darryle Sprinkles 60 y o  female MRN: [id number]  Unit/Bed#: [facility] 830-01 Encounter: [id number]    Assessment/Plan:    60 y o  with PMB POD# 1 from HILTON, BSO, extensive JODY, and hernia repair with general surgery, recovering well postoperatively  Advance diet  Will discuss with anesthesia decreasing PCEA dose due to hypotension and dense block on left  Encourage OOB and ambulation  Appreciate gen surg recs  * S/P HILTON-BSO (total abdominal hysterectomy and bilateral salpingo-oophorectomy)  Assessment & Plan  - FEN/GI: advance to regular diet  - NEURO/PAIN: PCEA in place, oxy PRN  - CV: hold home metoprolol in setting of hypotension, appreciate APS recs re: PCEA  - HEME: daily labs   - : appropriate urine output, maintain Arnold while PCEA in place  - ENDO: continue home meds for hypothyroidism  - PPx: SQH 5000u TID, OOB, ICS   - Lines/drains: R IJ (1/27- ), PCEA, MARCO ANTONIO x2     D/[facility] Dr Lamin Claudio[facility] [place] 59 Attending       Subjective:    Darryle Sprinkles has no current complaints  Pain is well controlled with epidural and oxycodone  Reports numbness on left side with stronger block on left > right  Patient is not currently voiding  She is not ambulating as she is worried that her left sided numbness will impact her ability to ambulate safely  Patient is not currently passing flatus and has had no bowel movement  She is tolerating PO, and denies nausea or vomitting  Patient denies fever, chills, chest pain, shortness of breath, or calf tenderness  Objective:  BP (!) 99/46   Pulse 87   Temp 97 9 °F (36 6 °C)   Resp 20   Ht 5' 2" (1 575 m)   Wt (!) 138 kg (305 lb)   SpO2 97%   BMI 55 79 kg/m²     I/O last 3 completed shifts: In: 6877 1 [I V :6877 1]  Out: 2020 [Urine:1580; Drains:140; Blood:300]  No intake/output data recorded      Lab Results   Component Value Date    WBC 15 86 (H) 01/28/2023    HGB 10 5 (L) 01/28/2023    HCT 36 7 01/28/2023    MCV 82 01/28/2023     01/28/2023       Lab Results   Component Value Date    CALCIUM 8 3 01/28/2023    K 4 5 01/28/2023    CO2 24 01/28/2023     01/28/2023    BUN 14 01/28/2023    CREATININE 1 10 01/28/2023           Physical Exam  Constitutional:       Appearance: Normal appearance  Neck:      Comments: R IJ catheter site c/d/i  Cardiovascular:      Rate and Rhythm: Normal rate  Pulses: Normal pulses  Pulmonary:      Effort: Pulmonary effort is normal    Abdominal:      General: There is no distension  Palpations: Abdomen is soft  Tenderness: There is abdominal tenderness  There is no guarding  Comments: Vertical midline incision with minimal strikethrough on dressing, c/d/i; MARCO ANTONIO drains x2 with ss output   Genitourinary:     Comments: Arnold: clear   Musculoskeletal:      Cervical back: Normal range of motion  Neurological:      Mental Status: She is alert and oriented to person, place, and time     Psychiatric:         Mood and Affect: Mood normal          Behavior: Behavior normal            Shubham Vogt MD  1/28/2023  9:27 AM

## 2023-01-28 NOTE — ASSESSMENT & PLAN NOTE
- FEN/GI: regular diet  - NEURO/PAIN: PCEA until POD3, oxy PRN  - CV: home metoprolol  - HEME: daily labs   - : appropriate urine output, maintain Arnold while PCEA in place  - ENDO: continue home meds for hypothyroidism  - PPx: SQH 5000u TID hold AM dose for PCEA removal on 1/30, OOB, ICS   - Appreciate PT recs  - Lines/drains: KELTON LING (1/27- ), TAL, MARCO ANTONIO x2

## 2023-01-28 NOTE — QUICK NOTE
How Severe Is Your Skin Lesion?: moderate Post op Note - GYN ONC Surgery   Darryle Sprinkles 60 y o  female MRN: [id number]  Unit/Bed#: [facility] 830-01 Encounter: [id number]    Assessment:  48-year-old female who underwent total abdominal hysterectomy, BSO, extensive lysis of adhesions, repair of incisional hernia with mesh component separation for postmenopausal bleeding and an incisional hernia earlier today  Patient is doing well postoperatively and her only complaint is pain  Plan:  --Epidural for pain control  -- IV fluids  -- Clear liquids as tolerated  -- DVT prophylaxis with heparin and SCDs  -- Incentive spirometry  -- Maintain Arnold catheter tonight  -- A m  labs  -- 2 MARCO ANTONIO drains to bulb suction      Subjective/Objective     Subjective: 48-year-old female who presented with postmenopausal bleeding and an incisional hernia who underwent a combined procedure of a total abdominal hysterectomy, BSO, extensive lysis of adhesions, and repair of incisional hernia with component separation  At this time her only complaint is abdominal discomfort   She does have an epidural in place, however states it initially was not hooked up  It seems to be working better now  She denies pain, shortness of breath, nausea, vomiting, fever and chills    Objective: 48-year-old morbidly obese female who appears comfortable in bed  She is alert oriented and cooperative    Blood pressure 144/69, pulse 86, temperature 98 2 °F (36 8 °C), resp  rate 14, height 5' 2" (1 575 m), weight (!) 138 kg (305 lb), SpO2 96 %  ,Body mass index is 55 79 kg/m²        Intake/Output Summary (Last 24 hours) at 1/27/2023 2023  Last data filed at 1/27/2023 1950  Gross per 24 hour   Intake 6275 ml   Output 1665 ml   Net 4610 ml       Invasive Devices     Central Venous Catheter Line  Duration           CVC Central Lines Triple Right Internal jugular -- days    CVC Central Lines 01/27/23 Triple <1 day          Peripheral Intravenous Line  Duration           Peripheral IV 01/27/23 Left Hand <1 day Epidural Line  Duration           Epidural Catheter 01/27/23 <1 day          Drain  Duration           Closed/Suction Drain Left Abdomen Bulb 10 Fr  <1 day    Closed/Suction Drain Right Abdomen Bulb 10 Fr  <1 day    Urethral Catheter Non-latex 16 Fr  <1 day                Physical Exam: General appearance: WDWN  Lungs: clear to auscultation bilaterally, without rales, rhonchi, or wheezes  Heart: regular rate and rhythm, S1, S2 normal, no murmur  Abdomen: soft, obese, appropriately tender; there is a scant amount of bloody drainage on the midportion of the incision    Abdominal binder in place  Extremities: without cyanosis, clubbing, or edema, motor/sensation grossly intact            Lab, Imaging and other studies:  Admission on 01/27/2023   Component Date Value   • ABO Grouping 01/27/2023 B    • Rh Factor 01/27/2023 Positive    • Antibody Screen 01/27/2023 Negative    • Specimen Expiration Date 01/27/2023 [id number]    • POC Glucose 01/27/2023 134    • POC Glucose 01/27/2023 136      VTE Pharmacologic Prophylaxis: Heparin  VTE Mechanical Prophylaxis: sequential compression device     Ton Herr PA-C Has Your Skin Lesion Been Treated?: not been treated Is This A New Presentation, Or A Follow-Up?: Skin Lesion

## 2023-01-28 NOTE — CONSULTS
Epidural Follow-up Note - Acute Pain Service    Luis Santos 60 y o  female MRN: [id number]  Unit/Bed#: [facility] 830-01 Encounter: [id number]      Assessment:   Principal Problem:    S/P HILTON-BSO (total abdominal hysterectomy and bilateral salpingo-oophorectomy)      Luis Santos is a 60y o  year old female POD# 1 from HILTON, BSO, extensive JODY, and hernia repair  Patient has an epidural for postop pain control  She was doing well from a pain perspective and was hypotensive this morning but now improved with fluid bolus  She was recently sat up and states pain has worsened  She states the epidural was doing well and she has a denser block on the left than right  Plan:  Analgesia:  - Patient's epidural is set to continuous rate only at 8cc/hr but would benefit from a lower dose for improvement in BP  - Change epidural to PCEA to 6/4/10/4 for improved pain control and better BPs  - Continue oxycodone for breakthrough pain    Pain History  Current pain location(s): Abdomen  Pain Scale:   3-8/10  Quality: Pressure  24 hour history: See above    Meds/Allergies       No Known Allergies    Objective     Temp:  [97 5 °F (36 4 °C)-99 °F (37 2 °C)] 97 9 °F (36 6 °C)  HR:  [] 87  Resp:  [14-22] 20  BP: ()/(46-85) 99/46    Physical Exam  Vitals and nursing note reviewed  Constitutional:       Appearance: Normal appearance  HENT:      Head: Normocephalic and atraumatic  Right Ear: External ear normal       Left Ear: External ear normal       Nose: Nose normal       Mouth/Throat:      Mouth: Mucous membranes are moist       Pharynx: Oropharynx is clear  Eyes:      Conjunctiva/sclera: Conjunctivae normal    Cardiovascular:      Rate and Rhythm: Normal rate and regular rhythm  Pulses: Normal pulses  Heart sounds: Normal heart sounds  Pulmonary:      Effort: Pulmonary effort is normal       Breath sounds: Normal breath sounds  Abdominal:      Palpations: Abdomen is soft  Tenderness:  There is abdominal tenderness  Musculoskeletal:         General: Normal range of motion  Cervical back: Normal range of motion and neck supple  Skin:     General: Skin is warm and dry  Neurological:      General: No focal deficit present  Mental Status: She is alert and oriented to person, place, and time  Mental status is at baseline  Psychiatric:         Mood and Affect: Mood normal        Epidural: Catheter in good position, site clean, not tender to palpation    Lab Results:   Results from last 7 days   Lab Units 01/28/23  0523   WBC Thousand/uL 15 86*   HEMOGLOBIN g/dL 10 5*   HEMATOCRIT % 36 7   PLATELETS Thousands/uL 317      Results from last 7 days   Lab Units 01/28/23  0523   POTASSIUM mmol/L 4 5   CHLORIDE mmol/L 106   CO2 mmol/L 24   BUN mg/dL 14   CREATININE mg/dL 1 10   CALCIUM mg/dL 8 3          Counseling / Coordination of Care  Total floor / unit time spent today 15 min  Greater than 50% of total time was spent with the patient and / or family counseling and / or coordination of care  Please note that the APS provides consultative services regarding pain management only  With the exception of ketamine, peripheral nerve catheters, and epidural infusions (and except when indicated), final decisions regarding starting or changing doses of analgesic medications are at the discretion of the consulting service  Off hours consultation and/or medication management is generally not available      Osman DelR eal MD  Acute Pain Service

## 2023-01-29 LAB
ANION GAP SERPL CALCULATED.3IONS-SCNC: 2 MMOL/L (ref 4–13)
BUN SERPL-MCNC: 13 MG/DL (ref 5–25)
CALCIUM SERPL-MCNC: 8.4 MG/DL (ref 8.3–10.1)
CHLORIDE SERPL-SCNC: 110 MMOL/L (ref 96–108)
CO2 SERPL-SCNC: 26 MMOL/L (ref 21–32)
CREAT SERPL-MCNC: 0.8 MG/DL (ref 0.6–1.3)
ERYTHROCYTE [DISTWIDTH] IN BLOOD BY AUTOMATED COUNT: 16 % (ref 11.6–15.1)
GFR SERPL CREATININE-BSD FRML MDRD: 80 ML/MIN/1.73SQ M
GLUCOSE SERPL-MCNC: 95 MG/DL (ref 65–140)
HCT VFR BLD AUTO: 34.8 % (ref 34.8–46.1)
HGB BLD-MCNC: 10.3 G/DL (ref 11.5–15.4)
MAGNESIUM SERPL-MCNC: 2.2 MG/DL (ref 1.6–2.6)
MCH RBC QN AUTO: 24.5 PG (ref 26.8–34.3)
MCHC RBC AUTO-ENTMCNC: 29.6 G/DL (ref 31.4–37.4)
MCV RBC AUTO: 83 FL (ref 82–98)
PLATELET # BLD AUTO: 285 THOUSANDS/UL (ref 149–390)
PMV BLD AUTO: 9.4 FL (ref 8.9–12.7)
POTASSIUM SERPL-SCNC: 4.2 MMOL/L (ref 3.5–5.3)
RBC # BLD AUTO: 4.2 MILLION/UL (ref 3.81–5.12)
SODIUM SERPL-SCNC: 138 MMOL/L (ref 135–147)
WBC # BLD AUTO: 11.9 THOUSAND/UL (ref 4.31–10.16)

## 2023-01-29 RX ORDER — METOPROLOL TARTRATE 50 MG/1
50 TABLET, FILM COATED ORAL EVERY 12 HOURS SCHEDULED
Status: DISCONTINUED | OUTPATIENT
Start: 2023-01-29 | End: 2023-02-02 | Stop reason: HOSPADM

## 2023-01-29 RX ORDER — SODIUM CHLORIDE 9 MG/ML
50 INJECTION, SOLUTION INTRAVENOUS CONTINUOUS
Status: DISCONTINUED | OUTPATIENT
Start: 2023-01-29 | End: 2023-01-30

## 2023-01-29 RX ADMIN — ROPIVACAINE HYDROCHLORIDE: 2 INJECTION, SOLUTION EPIDURAL; INFILTRATION at 11:02

## 2023-01-29 RX ADMIN — METOPROLOL TARTRATE 50 MG: 50 TABLET, FILM COATED ORAL at 10:42

## 2023-01-29 RX ADMIN — ACETAMINOPHEN 975 MG: 325 TABLET ORAL at 14:53

## 2023-01-29 RX ADMIN — METOPROLOL TARTRATE 50 MG: 50 TABLET, FILM COATED ORAL at 22:21

## 2023-01-29 RX ADMIN — LIOTHYRONINE SODIUM 5 MCG: 5 TABLET ORAL at 08:17

## 2023-01-29 RX ADMIN — LEVOTHYROXINE SODIUM 100 MCG: 100 TABLET ORAL at 08:17

## 2023-01-29 RX ADMIN — DOCUSATE SODIUM 100 MG: 100 CAPSULE, LIQUID FILLED ORAL at 18:16

## 2023-01-29 RX ADMIN — OXYCODONE HYDROCHLORIDE 5 MG: 5 TABLET ORAL at 04:37

## 2023-01-29 RX ADMIN — HEPARIN SODIUM 5000 UNITS: 5000 INJECTION INTRAVENOUS; SUBCUTANEOUS at 22:19

## 2023-01-29 RX ADMIN — OXYCODONE HYDROCHLORIDE 5 MG: 5 TABLET ORAL at 14:53

## 2023-01-29 RX ADMIN — HEPARIN SODIUM 5000 UNITS: 5000 INJECTION INTRAVENOUS; SUBCUTANEOUS at 04:38

## 2023-01-29 RX ADMIN — HEPARIN SODIUM 5000 UNITS: 5000 INJECTION INTRAVENOUS; SUBCUTANEOUS at 14:53

## 2023-01-29 RX ADMIN — ROPIVACAINE HYDROCHLORIDE: 2 INJECTION, SOLUTION EPIDURAL; INFILTRATION at 23:20

## 2023-01-29 RX ADMIN — DOCUSATE SODIUM 100 MG: 100 CAPSULE, LIQUID FILLED ORAL at 08:17

## 2023-01-29 RX ADMIN — ACETAMINOPHEN 975 MG: 325 TABLET ORAL at 22:19

## 2023-01-29 RX ADMIN — OXYCODONE HYDROCHLORIDE 5 MG: 5 TABLET ORAL at 20:35

## 2023-01-29 RX ADMIN — DEXTROSE, SODIUM CHLORIDE, AND POTASSIUM CHLORIDE 125 ML/HR: 5; .45; .15 INJECTION INTRAVENOUS at 02:02

## 2023-01-29 RX ADMIN — OXYCODONE HYDROCHLORIDE 5 MG: 5 TABLET ORAL at 10:08

## 2023-01-29 RX ADMIN — ACETAMINOPHEN 975 MG: 325 TABLET ORAL at 04:38

## 2023-01-29 RX ADMIN — SODIUM CHLORIDE 50 ML/HR: 0.9 INJECTION, SOLUTION INTRAVENOUS at 10:43

## 2023-01-29 NOTE — PHYSICAL THERAPY NOTE
Physical Therapy evaluation note     Patient Name: Letitia Jack    NJFHD'P Date: 1/29/2023     Problem List  Principal Problem:    S/P HILTON-BSO (total abdominal hysterectomy and bilateral salpingo-oophorectomy)       Past Medical History  Past Medical History:   Diagnosis Date   • Acute hypoxemic respiratory failure due to COVID-19 [facility])     trached-trachael damage-resection  damage to liver feeding tube   renal failure-dialysis   • History of renal failure     s/p sepsis and severe covid, was on dialysis   • History of transfusion     Pt unsure but thinks they gave her blood   • Primary hypertension         Past Surgical History  Past Surgical History:   Procedure Laterality Date   • CARPAL TUNNEL RELEASE     • EXPLORATORY LAPAROTOMY     • PEG TUBE PLACEMENT      and removed   • FL HYSTEROSCOPY BX ENDOMETRIUM&/POLYPC W/WO D&C N/A 10/17/2022    Procedure: DILATATION AND CURETTAGE (D&C) WITH HYSTEROSCOPY;  Surgeon: Chavo Lee MD;  Location: [facility];  Service: Gynecology Oncology   • REVISION TRACHEOSTOMY SCAR     • TRACHEOSTOMY     • TUMOR REMOVAL Left     upper arm at age 11, benign      01/29/23 0930   PT Last Visit   PT Visit Date 01/29/23   Note Type   Note type Evaluation   Pain Assessment   Pain Assessment Tool FLACC   Pain Rating: FLACC (Rest) - Face 0   Pain Rating: FLACC (Rest) - Legs 0   Pain Rating: FLACC (Rest) - Activity 0   Pain Rating: FLACC (Rest) - Cry 0   Pain Rating: FLACC (Rest) - Consolability 0   Score: FLACC (Rest) 0   Pain Rating: FLACC (Activity) - Face 1   Pain Rating: FLACC (Activity) - Legs 1   Pain Rating: FLACC (Activity) - Activity 1   Pain Rating: FLACC (Activity) - Cry 0   Pain Rating: FLACC (Activity) - Consolability 0   Score: FLACC (Activity) 3   Restrictions/Precautions   Weight Bearing Precautions Per Order No   Other Precautions Fall Risk;Pain;Multiple lines  (JPx2, PCA pain pump)   Home Living   Type of Home House  (condo)   Home Layout One level   Additional Comments Pt lives alone in a condo with [address]  Pt lives on one floor  Pt occ will sleep in her recliner chair  Pt owsn SPC and uses occ when she is fatigued  Pt's brother lives next door and able to assist  PT works full time and drives  Prior Function   Level of [place] Independent with ADLs; Independent with functional mobility; Independent with IADLS   Lives With Alone   Receives Help From Family  (brother)   IADLs Independent with driving; Independent with meal prep; Independent with medication management   Vocational Full time employment   Comments +   General   Family/Caregiver Present No   Cognition   Overall Cognitive Status WFL   Arousal/Participation Alert   Orientation Level Oriented X4   RLE Assessment   RLE Assessment   (grossly 3/5 observed with mobiltiy)   LLE Assessment   LLE Assessment   (grossly 3/5 observed with mobility)   Bed Mobility   Supine to Sit 3  Moderate assistance   Additional items Assist x 1;HOB elevated   Additional Comments sitting EOB S level   Transfers   Sit to Stand 4  Minimal assistance   Additional items Assist x 1   Stand to Sit 4  Minimal assistance   Additional items Assist x 1   Ambulation/Elevation   Gait pattern Excessively slow; Step to; Foward flexed; Shuffling   Gait Assistance 4  Minimal assist   Additional items Assist x 1   Assistive Device   (BUE HHA)   Distance 3ft to chair   Balance   Static Sitting Fair +   Dynamic Sitting Fair   Static Standing Fair -   Dynamic Standing Fair -   Ambulatory Fair -   Endurance Deficit   Endurance Deficit Yes   Activity Tolerance   Activity Tolerance Patient limited by fatigue;Patient limited by pain   Medical Staff Made Aware RN   Nurse Made Aware nurse approved therapy session   Assessment   Prognosis Good   Problem List Decreased mobility; Decreased endurance  (fear with mobility related to hx)   Assessment Pt is a 59 yo female admitted to [place] [address] on 1/27/2023 s/p surgery for hysterectomy, B salpingo-oophorectomy, with extensive lysis of adhesions, repair of hernia incisional with phasic mesh  Two patient identifiers were used to confirm  Pt lives in a condo with [address]  Pt lives alone but her brother lives close to assist as needed  Pt was I for ADL's, IADL's and mobility prior  Pt would occ use SPC for mobility when fatigued  Pt's impairments include reduced mobility, poor endurance, risk of falling, increased fear with mobility, gait abnormalities  These impairments limit the ability of the patient to perform mobility without increased assistance, return to PLOF and participate in everyday life activities  Pt would benefit from continued skilled therapy while in the hospital to improve overall mobility and work towards a safe d/c  Recommend discharge to home with no needs  At the end of the session the patient was left in seated position with call bell and phone within reach  The patient's AM-PAC Basic Mobility Inpatient Short Form Raw Score is 14  A Raw score of less than or equal to 16 suggests the patient may benefit from discharge to post-acute rehabilitation services  Please also refer to the recommendation of the Physical Therapist for safe discharge planning  Anticipate the patient will progress with pain management and increased time  Barriers to Discharge Inaccessible home environment   Goals   STG Expiration Date 02/12/23   Short Term Goal #1 STG 1: Pt will perform transfers at a MI/I level to return to baseline of function  STG 2: Pt will ambulate 300ft with RW at a MI/I level to reduce the level of assistance needed upon d/c home  STG 3: Pt will negotiate 1 steps with HR at a I level to ensure safety with activity when able to ambulate 50ft at a min a level  STG 4: Pt will perform bed mobility at a I to safety return to PLOF  PT Treatment Day 0   Plan   Treatment/Interventions Functional transfer training;LE strengthening/ROM; Therapeutic exercise; Endurance training;Elevations;Gait training;Bed mobility;OT   PT Frequency 3-5x/wk   Recommendation   PT Discharge Recommendation No rehabilitation needs   Equipment Recommended Walker  (TBD, might not need prior to discharge)   Romeo Barnes walker   Change/add to ChorPpay?  No   AM-PAC Basic Mobility Inpatient   Turning in Flat Bed Without Bedrails 2   Lying on Back to Sitting on Edge of Flat Bed Without Bedrails 2   Moving Bed to Chair 3   Standing Up From Chair Using Arms 3   Walk in Room 3   Climb 3-5 Stairs With Railing 1   Basic Mobility Inpatient Raw Score 14   Basic Mobility Standardized Score 35 55   Highest Level Of Mobility   JH-HLM Goal 4: Move to chair/commode   JH-HLM Achieved 4: Move to chair/commode   Gadiel Staff, PT, DPT

## 2023-01-29 NOTE — PLAN OF CARE
Problem: PHYSICAL THERAPY ADULT  Goal: Performs mobility at highest level of function for planned discharge setting  See evaluation for individualized goals  Description: Treatment/Interventions: Functional transfer training, LE strengthening/ROM, Therapeutic exercise, Endurance training, Elevations, Gait training, Bed mobility, OT  Equipment Recommended: Walker (TBD, might not need prior to discharge)       See flowsheet documentation for full assessment, interventions and recommendations  Outcome: Progressing  Note: Prognosis: Good  Problem List: Decreased mobility, Decreased endurance (fear with mobility related to hx)  Assessment: Pt is a 59 yo female admitted to [place] [address] on 1/27/2023 s/p surgery for hysterectomy, B salpingo-oophorectomy, with extensive lysis of adhesions, repair of hernia incisional with phasic mesh  Two patient identifiers were used to confirm  Pt lives in a condo with [address]  Pt lives alone but her brother lives close to assist as needed  Pt was I for ADL's, IADL's and mobility prior  Pt would occ use SPC for mobility when fatigued  Pt's impairments include reduced mobility, poor endurance, risk of falling, increased fear with mobility, gait abnormalities  These impairments limit the ability of the patient to perform mobility without increased assistance, return to PLOF and participate in everyday life activities  Pt would benefit from continued skilled therapy while in the hospital to improve overall mobility and work towards a safe d/c  Recommend discharge to home with no needs  At the end of the session the patient was left in seated position with call bell and phone within reach  The patient's AM-PAC Basic Mobility Inpatient Short Form Raw Score is 14  A Raw score of less than or equal to 16 suggests the patient may benefit from discharge to post-acute rehabilitation services  Please also refer to the recommendation of the Physical Therapist for safe discharge planning   Anticipate the patient will progress with pain management and increased time  Barriers to Discharge: Inaccessible home environment     PT Discharge Recommendation: No rehabilitation needs    See flowsheet documentation for full assessment

## 2023-01-29 NOTE — PROGRESS NOTES
[address] [facility]   For questions/concerns on this patient, please reach out to the following:  SLB-OB GYN-GynOnc- Resident/AP  Gyn Oncology Progress note   Susan Cortez 60 y o  female MRN: [id number]  [facility] Encounter: [id number]    Assessment/Plan:    60 y o  with PMB POD# 2 from HILTON, BSO, extensive JODY, and hernia repair with general surgery, recovering well postoperatively  Encouraged OOB and ambulation  Appreciate gen surg recs  Plan to d/c PCEA on POD3  * S/P HILTON-BSO (total abdominal hysterectomy and bilateral salpingo-oophorectomy)  Assessment & Plan  - FEN/GI: regular diet  - NEURO/PAIN: PCEA until POD3, oxy PRN  - CV: home metoprolol  - HEME: daily labs   - : appropriate urine output, maintain Arnold while PCEA in place  - ENDO: continue home meds for hypothyroidism  - PPx: SQH 5000u TID hold AM dose for PCEA removal on 1/30, OOB, ICS   - Appreciate PT recs  - Lines/drains: R IJ (1/27- ), PCEA, MARCO ANTONIO x2     D/[facility] Dr Jones [place], [facility] Attending       Subjective:    Susan Cortez now sitting in chair  Pain is well controlled at rest with PCEA and oxy PRN  Patient is not currently voiding  She is not yet ambulating  Patient is not currently passing flatus and has had no bowel movement  She is tolerating smmall amounts of PO, and denies nausea or vomitting  Patient denies fever, chills, chest pain, shortness of breath, or calf tenderness  Objective:  /75   Pulse 84   Temp 97 7 °F (36 5 °C)   Resp 16   Ht 5' 2" (1 575 m)   Wt (!) 138 kg (305 lb)   SpO2 90%   BMI 55 79 kg/m²     I/O last 3 completed shifts: In: 4317 [I V :3319]  Out: 2410 [Urine:2075; Drains:335]  No intake/output data recorded      Lab Results   Component Value Date    WBC 11 90 (H) 01/29/2023    HGB 10 3 (L) 01/29/2023    HCT 34 8 01/29/2023    MCV 83 01/29/2023     01/29/2023       Lab Results   Component Value Date    CALCIUM 8 4 01/29/2023    K 4 2 01/29/2023 CO2 26 01/29/2023     (H) 01/29/2023    BUN 13 01/29/2023    CREATININE 0 80 01/29/2023           Physical Exam  Constitutional:       Appearance: Normal appearance  Neck:      Comments: R IJ catheter site c/d/i  Cardiovascular:      Rate and Rhythm: Normal rate  Pulses: Normal pulses  Pulmonary:      Effort: Pulmonary effort is normal    Abdominal:      General: There is no distension  Palpations: Abdomen is soft  Tenderness: There is abdominal tenderness  There is no guarding  Comments: Vertical midline incision with minimal strikethrough on dressing, c/d/i; MARCO ANTONIO drains x2 with ss output   Genitourinary:     Comments: Arnold: clear   Musculoskeletal:      Cervical back: Normal range of motion  Neurological:      Mental Status: She is alert and oriented to person, place, and time     Psychiatric:         Mood and Affect: Mood normal          Behavior: Behavior normal            Luis Linares MD   Gyn Onc Fellow   1/29/2023  10:30 AM

## 2023-01-29 NOTE — PROGRESS NOTES
Progress Note - Acute Pain Service    Letitia Jack 60 y o  female MRN: [id number]  Unit/Bed#: [facility] 830-01 Encounter: [id number]      Assessment:   Principal Problem:    S/P HILTON-BSO (total abdominal hysterectomy and bilateral salpingo-oophorectomy)    Letitia Jack is a 60y o  year old female POD# 2 from HILTON, BSO, extensive JODY, and hernia repair  Patient has a lumbar epidural for postop pain control that initially was causing left lower extremity numbness and weakness  Her PCEA dose was adjusted yesterday and the numbness has resolved  At rest, her pain is minimal, but with movement, it worsens  She has taken several doses of her PRN opioids  She has not yet attempted to get out of bed but I spoke with the patient's nurse and that is the plan today  It is possible that the lumbar epidural may continue to cause weakness resulting in her inability to walk without significant assistance  I discussed this with the patient and that the epidural will be taken out tomorrow  Epidural site assessed and is fine  Plan:   1  Continue PCEA at current settings 22355  Given it's already 0 2% ropivacaine I wouldn't change it but this concentrated solution may also be contributing to weakness  2  Continue oxycodone PRNs as written  3  Anticipate epidural removal tomorrow  APS will continue to follow; please contact APS ( btwn [phone]) with any further questions    Pain History  Current pain location(s): abdomen   Pain Scale:   2/10  Quality: burning  24 hour history: epidural rate adjustment    Opioid requirement previous 24 hours: 1 dose of 5 mg oxycodone, 2 doses of 10 mg oxycodone  Meds/Allergies   all current active meds have been reviewed    No Known Allergies    Objective     Temp:  [97 7 °F (36 5 °C)-98 4 °F (36 9 °C)] 97 7 °F (36 5 °C)  HR:  [81-84] 84  Resp:  [16-20] 16  BP: (111-152)/(56-75) 140/75    Physical Exam  Constitutional:       General: She is not in acute distress  Appearance: She is obese  HENT:      Head: Normocephalic  Nose: Nose normal       Mouth/Throat:      Mouth: Mucous membranes are moist    Pulmonary:      Effort: Pulmonary effort is normal    Abdominal:      Tenderness: There is abdominal tenderness  Musculoskeletal:         General: Normal range of motion  Cervical back: Normal range of motion  Skin:     General: Skin is warm  Neurological:      General: No focal deficit present  Mental Status: She is alert and oriented to person, place, and time  Mental status is at baseline  Psychiatric:         Mood and Affect: Mood normal          Behavior: Behavior normal          Thought Content: Thought content normal          Judgment: Judgment normal          Lab Results:   Results from last 7 days   Lab Units 01/29/23  0446   WBC Thousand/uL 11 90*   HEMOGLOBIN g/dL 10 3*   HEMATOCRIT % 34 8   PLATELETS Thousands/uL 285      Results from last 7 days   Lab Units 01/29/23  0446   POTASSIUM mmol/L 4 2   CHLORIDE mmol/L 110*   CO2 mmol/L 26   BUN mg/dL 13   CREATININE mg/dL 0 80   CALCIUM mg/dL 8 4       Imaging Studies: I have personally reviewed pertinent reports  EKG, Pathology, and Other Studies: I have personally reviewed pertinent reports        Meme Henry MD

## 2023-01-29 NOTE — PLAN OF CARE
Problem: PAIN - ADULT  Goal: Verbalizes/displays adequate comfort level or baseline comfort level  Description: Interventions:  - Encourage patient to monitor pain and request assistance  - Assess pain using appropriate pain scale  - Administer analgesics based on type and severity of pain and evaluate response  - Implement non-pharmacological measures as appropriate and evaluate response  - Consider cultural and social influences on pain and pain management  - Notify physician/advanced practitioner if interventions unsuccessful or patient reports new pain  Outcome: Progressing     Problem: INFECTION - ADULT  Goal: Absence or prevention of progression during hospitalization  Description: INTERVENTIONS:  - Assess and monitor for signs and symptoms of infection  - Monitor lab/diagnostic results  - Monitor all insertion sites, i e  indwelling lines, tubes, and drains  - Monitor endotracheal if appropriate and nasal secretions for changes in amount and color  - [place] appropriate cooling/warming therapies per order  - Administer medications as ordered  - Instruct and encourage patient and family to use good hand hygiene technique  - Identify and instruct in appropriate isolation precautions for identified infection/condition  Outcome: Progressing  Goal: Absence of fever/infection during neutropenic period  Description: INTERVENTIONS:  - Monitor WBC    Outcome: Progressing

## 2023-01-29 NOTE — PROGRESS NOTES
Progress Note - General Surgery  : IVAN Red Surgery Resident on Serjio Mike 60 y o  female MRN: [id number]  Unit/Bed#: [facility] 830-01 Encounter: [id number]      Assessment:  60 y o  female s/p 1/27 Ex-lap, HILTON/BSO, abdominal wall reconstruction with component separation + mesh       Plan:  Regular diet  Abdominal binder at all times  MARCO ANTONIO drains to bulb suction, record output  APS for pain / epidural  Care per primary team        Subjective: No acute complaints, tolerated regular diet, denies flatus and denies BM      Objective:     Physical Exam:  GEN: NAD   Ab: Soft, appropriately mildly tender throughout, ND, CDI, MARCO ANTONIO SS  Lung: Normal effort   CV: RRR   Extrem: No CCE   Neuro: A+Ox3       I/O       01/27 0701 01/28 0700 01/28 0701 01/29 0700    P  O   0    I V  (mL/kg) 6877 1 (49 8) 2293 4 (16 6)    Total Intake(mL/kg) 6877 1 (49 8) 2293 4 (16 6)    Urine (mL/kg/hr) 1580 (0 5) 1800 (0 5)    Drains 140 205    Blood 300     Total Output 2020 2005    Net +4857 1 +288 4                Lab, Imaging and other studies: I have personally reviewed pertinent reports    , CBC with diff:   Lab Results   Component Value Date    WBC 15 86 (H) 01/28/2023    HGB 10 5 (L) 01/28/2023    HCT 36 7 01/28/2023    MCV 82 01/28/2023     01/28/2023    MCH 23 5 (L) 01/28/2023    MCHC 28 6 (L) 01/28/2023    RDW 15 9 (H) 01/28/2023    MPV 9 8 01/28/2023   , BMP/CMP:   Lab Results   Component Value Date    SODIUM 136 01/28/2023    K 4 5 01/28/2023     01/28/2023    CO2 24 01/28/2023    BUN 14 01/28/2023    CREATININE 1 10 01/28/2023    CALCIUM 8 3 01/28/2023    EGFR 55 01/28/2023         VTE Pharmacologic Prophylaxis: Heparin        Amaris Bertrand MD  1/29/2023 2:39 AM

## 2023-01-29 NOTE — ASSESSMENT & PLAN NOTE
· FEN: regular diet, replete K and Mg today  · NEURO/PAIN: hung Tylenol/robaxin, prn oxy 5/10  · CV: home metoprolol ordered  · HEME: daily labs  · : koo catheter removed, voiding  · ENDO: home levythyroxine & liothyronine ordered  · PPx: heparin 7500 U TID, OOB, ICS   · Appreciate PT/OT recs  · Gen surg following, appreciate recs  · Lines/drains: KELTON LING (1/27- ), MARCO ANTONIO x2  · RIJ to be removed this morning

## 2023-01-30 LAB
ANION GAP SERPL CALCULATED.3IONS-SCNC: 4 MMOL/L (ref 4–13)
BUN SERPL-MCNC: 8 MG/DL (ref 5–25)
CALCIUM SERPL-MCNC: 8.7 MG/DL (ref 8.3–10.1)
CHLORIDE SERPL-SCNC: 112 MMOL/L (ref 96–108)
CO2 SERPL-SCNC: 26 MMOL/L (ref 21–32)
CREAT SERPL-MCNC: 0.59 MG/DL (ref 0.6–1.3)
ERYTHROCYTE [DISTWIDTH] IN BLOOD BY AUTOMATED COUNT: 16.3 % (ref 11.6–15.1)
GFR SERPL CREATININE-BSD FRML MDRD: 100 ML/MIN/1.73SQ M
GLUCOSE SERPL-MCNC: 81 MG/DL (ref 65–140)
HCT VFR BLD AUTO: 34.7 % (ref 34.8–46.1)
HGB BLD-MCNC: 9.9 G/DL (ref 11.5–15.4)
MAGNESIUM SERPL-MCNC: 2 MG/DL (ref 1.6–2.6)
MCH RBC QN AUTO: 23.8 PG (ref 26.8–34.3)
MCHC RBC AUTO-ENTMCNC: 28.5 G/DL (ref 31.4–37.4)
MCV RBC AUTO: 83 FL (ref 82–98)
PLATELET # BLD AUTO: 276 THOUSANDS/UL (ref 149–390)
PMV BLD AUTO: 9.4 FL (ref 8.9–12.7)
POTASSIUM SERPL-SCNC: 4.1 MMOL/L (ref 3.5–5.3)
RBC # BLD AUTO: 4.16 MILLION/UL (ref 3.81–5.12)
SODIUM SERPL-SCNC: 142 MMOL/L (ref 135–147)
WBC # BLD AUTO: 10.09 THOUSAND/UL (ref 4.31–10.16)

## 2023-01-30 PROCEDURE — 05JY3ZZ INSPECTION OF UPPER VEIN, PERCUTANEOUS APPROACH: ICD-10-PCS | Performed by: OBSTETRICS & GYNECOLOGY

## 2023-01-30 RX ORDER — HEPARIN SODIUM 5000 [USP'U]/ML
7500 INJECTION, SOLUTION INTRAVENOUS; SUBCUTANEOUS EVERY 8 HOURS SCHEDULED
Status: DISCONTINUED | OUTPATIENT
Start: 2023-01-30 | End: 2023-02-02 | Stop reason: HOSPADM

## 2023-01-30 RX ORDER — METOCLOPRAMIDE HYDROCHLORIDE 5 MG/ML
5 INJECTION INTRAMUSCULAR; INTRAVENOUS EVERY 6 HOURS PRN
Status: DISCONTINUED | OUTPATIENT
Start: 2023-01-30 | End: 2023-02-02 | Stop reason: HOSPADM

## 2023-01-30 RX ADMIN — HEPARIN SODIUM 7500 UNITS: 5000 INJECTION INTRAVENOUS; SUBCUTANEOUS at 21:24

## 2023-01-30 RX ADMIN — ACETAMINOPHEN 975 MG: 325 TABLET ORAL at 21:22

## 2023-01-30 RX ADMIN — OXYCODONE HYDROCHLORIDE 10 MG: 10 TABLET ORAL at 15:57

## 2023-01-30 RX ADMIN — ACETAMINOPHEN 975 MG: 325 TABLET ORAL at 15:57

## 2023-01-30 RX ADMIN — DOCUSATE SODIUM 100 MG: 100 CAPSULE, LIQUID FILLED ORAL at 18:43

## 2023-01-30 RX ADMIN — OXYCODONE HYDROCHLORIDE 5 MG: 5 TABLET ORAL at 02:17

## 2023-01-30 RX ADMIN — LEVOTHYROXINE SODIUM 100 MCG: 100 TABLET ORAL at 09:10

## 2023-01-30 RX ADMIN — HEPARIN SODIUM 7500 UNITS: 5000 INJECTION INTRAVENOUS; SUBCUTANEOUS at 15:57

## 2023-01-30 RX ADMIN — LIOTHYRONINE SODIUM 5 MCG: 5 TABLET ORAL at 12:13

## 2023-01-30 RX ADMIN — ACETAMINOPHEN 975 MG: 325 TABLET ORAL at 05:22

## 2023-01-30 RX ADMIN — OXYCODONE HYDROCHLORIDE 5 MG: 5 TABLET ORAL at 09:12

## 2023-01-30 RX ADMIN — METOPROLOL TARTRATE 50 MG: 50 TABLET, FILM COATED ORAL at 21:22

## 2023-01-30 RX ADMIN — METOPROLOL TARTRATE 50 MG: 50 TABLET, FILM COATED ORAL at 09:10

## 2023-01-30 RX ADMIN — SODIUM CHLORIDE 50 ML/HR: 0.9 INJECTION, SOLUTION INTRAVENOUS at 05:27

## 2023-01-30 RX ADMIN — DOCUSATE SODIUM 100 MG: 100 CAPSULE, LIQUID FILLED ORAL at 09:10

## 2023-01-30 NOTE — CASE MANAGEMENT
Case Management Assessment & Discharge Planning Note    Patient name Carlos Olguin  Location [address] [place] [address] 830/[facility] 830-01 MRN [id number]  : 1965 Date 2023       Current Admission Date: 2023  Current Admission Diagnosis:S/P HILTON-BSO (total abdominal hysterectomy and bilateral salpingo-oophorectomy)   Patient Active Problem List    Diagnosis Date Noted   • Chronic endometritis 2022   • Morbid obesity with BMI of 45 0-49 9, adult ([facility] ) 2022   • S/P HILTON-BSO (total abdominal hysterectomy and bilateral salpingo-oophorectomy) 2022   • Tracheal stenosis due to tracheostomy ([facility] ) 2022   • Acquired hypothyroidism 2022   • Ventral hernia without obstruction or gangrene 2022      LOS (days): 3  Geometric Mean LOS (GMLOS) (days): 2 60  Days to GMLOS:-0 5     OBJECTIVE:    Risk of Unplanned Readmission Score: 8 98         Current admission status: Inpatient       Preferred Pharmacy:   [address] [place] [address], [address] [place] [address]  Phone: [phone] Fax: [address], [address] [facility] 94 [facility] 94 [facility] 38 [address] [place] [address]  Phone: [phone] Fax: [phone]    Primary Care Provider: Malika Siddiqui    Primary Insurance: MEDICARE  Secondary Insurance: AETNA    ASSESSMENT:  Active Health Care Proxies    There are no active Health Care Proxies on file  Readmission Root Cause  30 Day Readmission: No    Patient Information  Admitted from[de-identified] Home  Mental Status: Alert  During Assessment patient was accompanied by: Not accompanied during assessment  Assessment information provided by[de-identified] Patient  Primary Caregiver: Self  Support Systems: Family members  Home entry access options  Select all that apply : Stairs  Number of steps to enter home  : 1  Do the steps have railings?: No  Type of Current Residence: [address] [place] [address] Arrangements: Lives Alone (Brother lives next door)    Activities of Daily Living Prior to Admission  Functional Status: Independent  Completes ADLs independently?: Yes  Ambulates independently?: Yes  Does patient use assisted devices?: Yes  Assisted Devices (DME) used: Straight Cane  Does patient currently own DME?: Yes  What DME does the patient currently own?: Jane Nelson  Does patient have a history of Outpatient Therapy (PT/OT)?: No  Does the patient have a history of Short-Term Rehab?: Yes ([facility] acute rehab, [facility] subacute, [address] [place] [address] rehab)  Does patient have a history of HHC?: Yes  Does patient currently have [address]?: No         Patient Information Continued  Income Source: SSI/SSD  Does patient have prescription coverage?: Yes  Does patient have a history of substance abuse?: No  Does patient have a history of Mental Health Diagnosis?: No         Means of Transportation  Means of Transport to Appts[de-identified] Drives Self        DISCHARGE DETAILS:    Discharge planning discussed with[de-identified] patient  Freedom of Choice: Yes     CM contacted family/caregiver?: No- see comments  Were Treatment Team discharge recommendations reviewed with patient/caregiver?: Yes  Did patient/caregiver verbalize understanding of patient care needs?: Yes  Were patient/caregiver advised of the risks associated with not following Treatment Team discharge recommendations?: Yes    Contacts  Patient Contacts: brother Roby Brar  Relationship to Patient[de-identified] Family  Contact Method: Phone  Phone Number: [phone]  Reason/Outcome: Emergency [address]         Is the patient interested in [address] at discharge?: No    DME Referral Provided  Referral made for DME?: No                    Transport at Discharge : Family         S/w pt & lives alone in 1 level condget  Has family & friends local to assist if needed  Lives 2 hrs away  Brother will transport pt home

## 2023-01-30 NOTE — PROGRESS NOTES
For questions/concerns on this patient, please reach out to the following:  [facility] GYN-GynOnc- Resident/AP  Gyn Oncology Progress note   Radha Tamez 60 y o  female MRN: [id number]  Unit/Bed#: [facility] 830-01 Encounter: [id number]    Assessment/Plan:  60 y o  with PMB POD# 3 from HILTON, BSO, extensive JODY, and hernia repair with general surgery, recovering well postoperatively  Plan for epidural removal today  * S/P HILTON-BSO (total abdominal hysterectomy and bilateral salpingo-oophorectomy)  Assessment & Plan  · FEN: regular diet, NS 50 mL/hr  · NEURO/PAIN: PCEA to be removed today (heparin held), hung Tylenol, prn oxy 5/10  · CV: home metoprolol ordered  · HEME: daily labs  · : maintain Koo catheter while PCEA in place, UOP adequate at 1 cc/kg/hr  · ENDO: home liothyronine ordered  · PPx: heparin 5000 U TID (held for PCEA removal today), OOB, ICS   · Appreciate PT recs  · Lines/drains: R IJ (1/27- ), PCEA, MARCO ANTONIO x2       Subjective:  Radha Tamez currently notes back pain as her major concern  She is having pain right at the site of her epidural   Her abdominal pain remains well controlled  Patient is not currently voiding as her koo catheter remains in place  She is ambulating  Patient is not currently passing flatus and has had no bowel movement  She is tolerating PO, and denies nausea or vomitting  Patient denies fever, chills, chest pain, shortness of breath, or calf tenderness  Objective:  /81   Pulse 76   Temp 98 1 °F (36 7 °C)   Resp 20   Ht 5' 2" (1 575 m)   Wt (!) 138 kg (305 lb)   SpO2 95%   BMI 55 79 kg/m²     I/O last 3 completed shifts:   In: 4861 1 [P O :1440; I V :3421 1]  Out: 2078 [Urine:3000; Drains:315]  I/O this shift:  In: 1118 2 [I V :1118 2]  Out: 2580 [Urine:2400; Drains:180]    Lab Results   Component Value Date    WBC 11 90 (H) 01/29/2023    HGB 10 3 (L) 01/29/2023    HCT 34 8 01/29/2023    MCV 83 01/29/2023     01/29/2023       Lab Results   Component Value Date    CALCIUM 8 4 01/29/2023    K 4 2 01/29/2023    CO2 26 01/29/2023     (H) 01/29/2023    BUN 13 01/29/2023    CREATININE 0 80 01/29/2023           Physical Exam  Constitutional:       General: She is not in acute distress  Appearance: Normal appearance  She is not ill-appearing  HENT:      Mouth/Throat:      Mouth: Mucous membranes are moist    Eyes:      Extraocular Movements: Extraocular movements intact  Cardiovascular:      Rate and Rhythm: Normal rate and regular rhythm  Heart sounds: Normal heart sounds  Pulmonary:      Effort: Pulmonary effort is normal       Breath sounds: Normal breath sounds  Abdominal:      General: There is no distension  Palpations: Abdomen is soft  Tenderness: There is no abdominal tenderness  There is no guarding  Comments: Midline vertical incision covered in adhesive dressing though visible portions remain clean, dry, and intact with no surrounding erythema or induration  Bilateral MARCO ANTONIO drain sites clean, dry, and intact with no surrounding erythema or induration  Musculoskeletal:         General: No swelling  Right lower leg: No edema  Left lower leg: No edema  Comments: Right IJ central line site clean, dry, and intact with no surrounding erythema or induration  Skin:     General: Skin is warm and dry  Coloration: Skin is not jaundiced or pale  Neurological:      General: No focal deficit present  Mental Status: She is alert  Psychiatric:         Mood and Affect: Mood normal          Behavior: Behavior normal          Thought Content:  Thought content normal          Judgment: Judgment normal            Homer Bangura MD  1/30/2023  6:10 AM

## 2023-01-30 NOTE — PHYSICAL THERAPY NOTE
PHYSICAL THERAPY NOTE          Patient Name: Darryle Sprinkles AYDDE'I Date: 1/30/2023 01/30/23 0910   PT Last Visit   PT Visit Date 01/30/23   Note Type   Note Type Treatment   Pain Assessment   Pain Assessment Tool 0-10   Pain Score 5   Pain Location/Orientation Location: Abdomen; Location: Back   Restrictions/Precautions   Other Precautions Multiple lines; Fall Risk;Pain   General   Chart Reviewed Yes   Cognition   Arousal/Participation Alert; Responsive   Attention Within functional limits   Orientation Level Oriented X4   Following Commands Follows all commands and directions without difficulty   Bed Mobility   Rolling L 4  Minimal assistance   Additional items Assist x 1;Bedrails; Increased time required   Supine to Sit 4  Minimal assistance   Additional items Assist x 1;HOB elevated; Bedrails; Increased time required;LE management   Sit to Supine Unable to assess   Additional Comments Pt noted to be in bed upon arrival, assisted to bedside chair post session   Transfers   Sit to Stand 5  Supervision   Additional items Assist x 1; Increased time required;Verbal cues   Stand to Sit 5  Supervision   Additional items Increased time required   Additional Comments using RW   Ambulation/Elevation   Gait pattern   (slow gait)   Gait Assistance 5  Supervision   Additional items Assist x 1   Assistive Device Rolling walker   Distance 150'   Ambulation/Elevation Additional Comments Pt ambulates in hallway this date with RW with supervision  Pt with slow gait, wide SCARLETT   Limited due to abd pain and fatigue   Balance   Static Sitting Good   Dynamic Sitting Fair +   Static Standing Fair   Dynamic Standing Fair -   Endurance Deficit   Endurance Deficit Yes   Endurance Deficit Description weakness   Activity Tolerance   Activity Tolerance Patient limited by fatigue;Patient limited by pain   Nurse Made Aware RN cleared pt for therapy   Assessment Prognosis Good   Problem List Decreased endurance;Decreased mobility;Pain   Assessment Pt seen for PT treatment session this date  Therapy session focused on bed mobility, functional transfers, and ambulation in order to improve overall mobility and independence  Pt requires Min assist for bed mobility, primarily limited due to abdominal pain( pt states will be primarily using recliner at home to sleep)  Pt transfers and ambulates with RW with Supervision this date, limited due to fatigue and pain but steady gait  Pt performed hygiene acre with nurse, PT focused on dynamic sitting balance and standing balance ,endurance training  Pt making steady progress toward goals  Pt was left in bedside recliner at the end of PT session with all needs in reach  Pt would benefit from continued PT services while in hospital to address remaining limitations  The patient's AM-PAC Basic Mobility Inpatient Short Form Raw Score is 17  A Raw score of greater than 16 suggests the patient may benefit from discharge to home  Please also refer to the recommendation of the Physical Therapist for safe discharge planning  Post d/c rec Home,no needs, may need RW post d/c pending further progress  Goals   Patient Goals to go home   STG Expiration Date 02/12/23   PT Treatment Day 1   Plan   Treatment/Interventions Gait training;Elevations; Functional transfer training;Spoke to nursing   Progress Progressing toward goals   PT Frequency 3-5x/wk   Recommendation   PT Discharge Recommendation No rehabilitation needs   Equipment Recommended Walker   AM-PAC Basic Mobility Inpatient   Turning in Flat Bed Without Bedrails 3   Lying on Back to Sitting on Edge of Flat Bed Without Bedrails 3   Moving Bed to Chair 3   Standing Up From Chair Using Arms 3   Walk in Room 3   Climb 3-5 Stairs With Railing 2   Basic Mobility Inpatient Raw Score 17   Basic Mobility Standardized Score 39 67   Highest Level Of Mobility   -[facility] Goal 5: Stand one or more mins JH-HLM Achieved 7: Walk 25 feet or more   Education   Education Provided Mobility training   Patient Demonstrates acceptance/verbal understanding     Yeny Hendrickson PT DPT

## 2023-01-30 NOTE — PROGRESS NOTES
Epidural Follow-up Note - Acute Pain Service    Gonzalez Mendez 60 y o  female MRN: [id number]  Unit/Bed#: [facility] 830-01 Encounter: [id number]      Assessment:   Principal Problem:    S/P HILTON-BSO (total abdominal hysterectomy and bilateral salpingo-oophorectomy)      Gonzalez Mendez is a 60y o  year old female POD# 3 from HILTON, BSO, extensive JODY, and hernia repair  Patient states her pain is well controlled and she is tolerating PO pain medications  She has been OOB and ambulating without issue  Primary service has request epidural removal today and they have held her anticoagulation in anticipation  Plan:  Analgesia:  - Discontinue epidural infusion   - Last platelet count   Lab Results   Component Value Date     01/30/2023     - Continue PO pain regimen as written    APS will continue to follow    Plan discussed with primary team    Pain History  Current pain location(s): Abdomen  Pain Scale:   3/10  Quality: Sore  24 hour history: See above    Meds/Allergies     No Known Allergies    Objective     Temp:  [97 7 °F (36 5 °C)-98 4 °F (36 9 °C)] 98 4 °F (36 9 °C)  HR:  [75-90] 75  Resp:  [16-20] 18  BP: (139-157)/(71-81) 157/81    Physical Exam  Vitals and nursing note reviewed  Constitutional:       Appearance: Normal appearance  HENT:      Head: Normocephalic and atraumatic  Right Ear: External ear normal       Left Ear: External ear normal       Nose: Nose normal       Mouth/Throat:      Mouth: Mucous membranes are moist       Pharynx: Oropharynx is clear  Eyes:      Conjunctiva/sclera: Conjunctivae normal    Cardiovascular:      Rate and Rhythm: Normal rate and regular rhythm  Pulses: Normal pulses  Heart sounds: Normal heart sounds  Pulmonary:      Effort: Pulmonary effort is normal       Breath sounds: Normal breath sounds  Abdominal:      General: Bowel sounds are normal       Palpations: Abdomen is soft  Tenderness: There is abdominal tenderness     Musculoskeletal: General: Normal range of motion  Cervical back: Normal range of motion and neck supple  Skin:     General: Skin is warm and dry  Neurological:      General: No focal deficit present  Mental Status: She is alert and oriented to person, place, and time  Mental status is at baseline  Psychiatric:         Mood and Affect: Mood normal        Epidural: Catheter removed, tip intact, site clean and not tender to palpation    Lab Results:   Results from last 7 days   Lab Units 01/30/23  0524   WBC Thousand/uL 10 09   HEMOGLOBIN g/dL 9 9*   HEMATOCRIT % 34 7*   PLATELETS Thousands/uL 276      Results from last 7 days   Lab Units 01/30/23  0524   POTASSIUM mmol/L 4 1   CHLORIDE mmol/L 112*   CO2 mmol/L 26   BUN mg/dL 8   CREATININE mg/dL 0 59*   CALCIUM mg/dL 8 7          Counseling / Coordination of Care  Total floor / unit time spent today 15 min  Greater than 50% of total time was spent with the patient and / or family counseling and / or coordination of care  Please note that the APS provides consultative services regarding pain management only  With the exception of ketamine, peripheral nerve catheters, and epidural infusions (and except when indicated), final decisions regarding starting or changing doses of analgesic medications are at the discretion of the consulting service  Off hours consultation and/or medication management is generally not available      Chandrakant Cadet MD  Acute Pain Service

## 2023-01-30 NOTE — NURSING NOTE
Attempted bedside PICC placement in LUE cephalic  All other vessels are too deep or unable to be visualized  Pt was unable to lay flat and was extremely anxious during assessment and procedure  Cephalic vein too small for double lumen, but reached out to Dr Den Zuniga and chose to proceed with SL 4Fr placement  Access to vessel via introducer obtained  Pt had significant vasoconstriction while attempting to pass the wire  Attempted to wait out the spasms, but pt became agitated and insisted on terminating the procedure  Dr Den Zuniga notified of unsuccessful attempt  Pt will need to go to IR for PICC placement if one is desired  Primary RN, Wyatt Wisdom, and Kyler Gomez, at bedside during entire procedure attempt and evaluation  A total of 45 minutes spent at bedside

## 2023-01-30 NOTE — PROGRESS NOTES
Progress Note - General Surgery   Kathi Flores 60 y o  female MRN: [id number]  Unit/Bed#: [facility] 830-01 Encounter: [id number]    Assessment:  Post menopausal bleeding  Incisional hernia  HILTON/BSO with extensive JODY  Compartment seperation, incisional hernia repair    Plan:  Monitor MARCO ANTONIO drainage X2  Pain control per primary team    Subjective/Objective   Chief Complaint: abdominal soreness    Subjective: "The drains look good"    Objective: comfortable with EDC    Blood pressure 153/71, pulse 80, temperature 97 7 °F (36 5 °C), resp  rate 20, height 5' 2" (1 575 m), weight (!) 138 kg (305 lb), SpO2 94 %  ,Body mass index is 55 79 kg/m²        Intake/Output Summary (Last 24 hours) at 1/29/2023 2145  Last data filed at 1/29/2023 2100  Gross per 24 hour   Intake 3831 65 ml   Output 3790 ml   Net 41 65 ml       Invasive Devices     Central Venous Catheter Line  Duration           CVC Central Lines Triple Right Internal jugular -- days    CVC Central Lines 01/27/23 Triple 2 days          Epidural Line  Duration           Epidural Catheter 01/27/23 2 days          Drain  Duration           Closed/Suction Drain Left Abdomen Bulb 10 Fr  2 days    Closed/Suction Drain Right Abdomen Bulb 10 Fr  2 days    Urethral Catheter Non-latex 16 Fr  2 days                Physical Exam:Awake and alert  GCS - 15  RRR, no complaints of chest pain  Lungs CTA bilaterally  Abdomen remains a little tender  MARCO ANTONIO drains with minimal serosanginous drainage  Moving extremities, OOB to chair yesterday and ambulated  Skin is warm and dry    Lab, Imaging and other studies:     Latest Reference Range & Units 01/30/23 05:24   Sodium 135 - 147 mmol/L 142   Potassium 3 5 - 5 3 mmol/L 4 1   Chloride 96 - 108 mmol/L 112 (H)   CO2 21 - 32 mmol/L 26   Anion Gap 4 - 13 mmol/L 4   BUN 5 - 25 mg/dL 8   Creatinine 0 60 - 1 30 mg/dL 0 59 (L)   Glucose, Random 65 - 140 mg/dL 81   Calcium 8 3 - 10 1 mg/dL 8 7   eGFR ml/min/1 73sq m 100   Magnesium 1 6 - 2 6 mg/dL 2 0 WBC 4 31 - 10 16 Thousand/uL 10 09   Red Blood Cell Count 3 81 - 5 12 Million/uL 4 16   Hemoglobin 11 5 - 15 4 g/dL 9 9 (L)   HCT 34 8 - 46 1 % 34 7 (L)   MCV 82 - 98 fL 83   MCH 26 8 - 34 3 pg 23 8 (L)   MCHC 31 4 - 37 4 g/dL 28 5 (L)   RDW 11 6 - 15 1 % 16 3 (H)   Platelet Count 482 - 390 Thousands/uL 276   MPV 8 9 - 12 7 fL 9 4   (H): Data is abnormally high  (L): Data is abnormally low  VTE Pharmacologic Prophylaxis: Heparin  VTE Mechanical Prophylaxis: sequential compression device

## 2023-01-30 NOTE — PLAN OF CARE
Problem: PHYSICAL THERAPY ADULT  Goal: Performs mobility at highest level of function for planned discharge setting  See evaluation for individualized goals  Description: Treatment/Interventions: Functional transfer training, LE strengthening/ROM, Therapeutic exercise, Endurance training, Elevations, Gait training, Bed mobility, OT  Equipment Recommended: Walker (TBD, might not need prior to discharge)       See flowsheet documentation for full assessment, interventions and recommendations  Outcome: Progressing  Note: Prognosis: Good  Problem List: Decreased endurance, Decreased mobility, Pain  Assessment: Pt seen for PT treatment session this date  Therapy session focused on bed mobility, functional transfers, and ambulation in order to improve overall mobility and independence  Pt requires Min assist for bed mobility, primarily limited due to abdominal pain( pt states will be primarily using recliner at home to sleep)  Pt transfers and ambulates with RW with Supervision this date, limited due to fatigue and pain but steady gait  Pt performed hygiene acre with nurse, PT focused on dynamic sitting balance and standing balance ,endurance training  Pt making steady progress toward goals  Pt was left in bedside recliner at the end of PT session with all needs in reach  Pt would benefit from continued PT services while in hospital to address remaining limitations  The patient's AM-PAC Basic Mobility Inpatient Short Form Raw Score is 17  A Raw score of greater than 16 suggests the patient may benefit from discharge to home  Please also refer to the recommendation of the Physical Therapist for safe discharge planning  Post d/c rec Home,no needs, may need RW post d/c pending further progress  Barriers to Discharge: Inaccessible home environment     PT Discharge Recommendation: No rehabilitation needs    See flowsheet documentation for full assessment

## 2023-01-30 NOTE — PROGRESS NOTES
23 1800   Clinical Encounter Type   Visited With Patient   Routine Visit Introduction      Pastoral Care Progress Note    2023  Patient: Sonia Cárdenas : 1965  Admission Date & Time: 2023 1645  MRN: [id number] CSN: [id number]           in to talk after making eye contact while on the floor  Patient shared family/personal stories; no  needs at this time  Appeared well cared for by family and friends  Happy to connect with her and if any needs arise, please contact spiritual care

## 2023-01-31 LAB
ANION GAP SERPL CALCULATED.3IONS-SCNC: 4 MMOL/L (ref 4–13)
BUN SERPL-MCNC: 8 MG/DL (ref 5–25)
CALCIUM SERPL-MCNC: 8.7 MG/DL (ref 8.3–10.1)
CHLORIDE SERPL-SCNC: 109 MMOL/L (ref 96–108)
CO2 SERPL-SCNC: 27 MMOL/L (ref 21–32)
CREAT SERPL-MCNC: 0.63 MG/DL (ref 0.6–1.3)
ERYTHROCYTE [DISTWIDTH] IN BLOOD BY AUTOMATED COUNT: 16 % (ref 11.6–15.1)
GFR SERPL CREATININE-BSD FRML MDRD: 98 ML/MIN/1.73SQ M
GLUCOSE SERPL-MCNC: 133 MG/DL (ref 65–140)
HCT VFR BLD AUTO: 34.5 % (ref 34.8–46.1)
HGB BLD-MCNC: 10 G/DL (ref 11.5–15.4)
MAGNESIUM SERPL-MCNC: 1.9 MG/DL (ref 1.6–2.6)
MCH RBC QN AUTO: 23.8 PG (ref 26.8–34.3)
MCHC RBC AUTO-ENTMCNC: 29 G/DL (ref 31.4–37.4)
MCV RBC AUTO: 82 FL (ref 82–98)
PLATELET # BLD AUTO: 295 THOUSANDS/UL (ref 149–390)
PMV BLD AUTO: 8.8 FL (ref 8.9–12.7)
POTASSIUM SERPL-SCNC: 3.6 MMOL/L (ref 3.5–5.3)
RBC # BLD AUTO: 4.2 MILLION/UL (ref 3.81–5.12)
SODIUM SERPL-SCNC: 140 MMOL/L (ref 135–147)
WBC # BLD AUTO: 9.3 THOUSAND/UL (ref 4.31–10.16)

## 2023-01-31 RX ORDER — POLYETHYLENE GLYCOL 3350 17 G/17G
17 POWDER, FOR SOLUTION ORAL DAILY
Status: DISCONTINUED | OUTPATIENT
Start: 2023-01-31 | End: 2023-02-02 | Stop reason: HOSPADM

## 2023-01-31 RX ORDER — METHOCARBAMOL 500 MG/1
500 TABLET, FILM COATED ORAL EVERY 6 HOURS PRN
Status: DISCONTINUED | OUTPATIENT
Start: 2023-01-31 | End: 2023-01-31

## 2023-01-31 RX ORDER — POTASSIUM CHLORIDE 20MEQ/15ML
40 LIQUID (ML) ORAL ONCE
Status: COMPLETED | OUTPATIENT
Start: 2023-01-31 | End: 2023-01-31

## 2023-01-31 RX ORDER — METHOCARBAMOL 500 MG/1
500 TABLET, FILM COATED ORAL EVERY 8 HOURS SCHEDULED
Status: DISCONTINUED | OUTPATIENT
Start: 2023-01-31 | End: 2023-02-02 | Stop reason: HOSPADM

## 2023-01-31 RX ORDER — LANOLIN ALCOHOL/MO/W.PET/CERES
400 CREAM (GRAM) TOPICAL ONCE
Status: COMPLETED | OUTPATIENT
Start: 2023-01-31 | End: 2023-01-31

## 2023-01-31 RX ADMIN — MAGNESIUM OXIDE TAB 400 MG (241.3 MG ELEMENTAL MG) 400 MG: 400 (241.3 MG) TAB at 09:01

## 2023-01-31 RX ADMIN — METOPROLOL TARTRATE 50 MG: 50 TABLET, FILM COATED ORAL at 09:01

## 2023-01-31 RX ADMIN — HEPARIN SODIUM 7500 UNITS: 5000 INJECTION INTRAVENOUS; SUBCUTANEOUS at 21:29

## 2023-01-31 RX ADMIN — ACETAMINOPHEN 975 MG: 325 TABLET ORAL at 04:29

## 2023-01-31 RX ADMIN — POLYETHYLENE GLYCOL 3350 17 G: 17 POWDER, FOR SOLUTION ORAL at 09:02

## 2023-01-31 RX ADMIN — METOPROLOL TARTRATE 50 MG: 50 TABLET, FILM COATED ORAL at 21:24

## 2023-01-31 RX ADMIN — POTASSIUM CHLORIDE 40 MEQ: 20 SOLUTION ORAL at 09:01

## 2023-01-31 RX ADMIN — LIOTHYRONINE SODIUM 5 MCG: 5 TABLET ORAL at 09:02

## 2023-01-31 RX ADMIN — METHOCARBAMOL 500 MG: 500 TABLET ORAL at 14:00

## 2023-01-31 RX ADMIN — ACETAMINOPHEN 975 MG: 325 TABLET ORAL at 21:27

## 2023-01-31 RX ADMIN — LEVOTHYROXINE SODIUM 100 MCG: 100 TABLET ORAL at 04:29

## 2023-01-31 RX ADMIN — OXYCODONE HYDROCHLORIDE 5 MG: 5 TABLET ORAL at 10:12

## 2023-01-31 RX ADMIN — HEPARIN SODIUM 7500 UNITS: 5000 INJECTION INTRAVENOUS; SUBCUTANEOUS at 04:29

## 2023-01-31 RX ADMIN — DOCUSATE SODIUM 100 MG: 100 CAPSULE, LIQUID FILLED ORAL at 09:01

## 2023-01-31 RX ADMIN — HEPARIN SODIUM 7500 UNITS: 5000 INJECTION INTRAVENOUS; SUBCUTANEOUS at 14:00

## 2023-01-31 RX ADMIN — ACETAMINOPHEN 975 MG: 325 TABLET ORAL at 14:00

## 2023-01-31 RX ADMIN — METHOCARBAMOL 500 MG: 500 TABLET ORAL at 21:27

## 2023-01-31 RX ADMIN — DOCUSATE SODIUM 100 MG: 100 CAPSULE, LIQUID FILLED ORAL at 17:21

## 2023-01-31 NOTE — PLAN OF CARE
Problem: Prexisting or High Potential for Compromised Skin Integrity  Goal: Skin integrity is maintained or improved  Description: INTERVENTIONS:  - Identify patients at risk for skin breakdown  - Assess and monitor skin integrity  - Assess and monitor nutrition and hydration status  - Monitor labs   - Assess for incontinence   - Turn and reposition patient  - Assist with mobility/ambulation  - Relieve pressure over bony prominences  - Avoid friction and shearing  - Provide appropriate hygiene as needed including keeping skin clean and dry  - Evaluate need for skin moisturizer/barrier cream  - Collaborate with interdisciplinary team   - Patient/family teaching  - Consider wound care consult   Outcome: Progressing     Problem: PAIN - ADULT  Goal: Verbalizes/displays adequate comfort level or baseline comfort level  Description: Interventions:  - Encourage patient to monitor pain and request assistance  - Assess pain using appropriate pain scale  - Administer analgesics based on type and severity of pain and evaluate response  - Implement non-pharmacological measures as appropriate and evaluate response  - Consider cultural and social influences on pain and pain management  - Notify physician/advanced practitioner if interventions unsuccessful or patient reports new pain  Outcome: Progressing     Problem: INFECTION - ADULT  Goal: Absence or prevention of progression during hospitalization  Description: INTERVENTIONS:  - Assess and monitor for signs and symptoms of infection  - Monitor lab/diagnostic results  - Monitor all insertion sites, i e  indwelling lines, tubes, and drains  - Monitor endotracheal if appropriate and nasal secretions for changes in amount and color  - [place] appropriate cooling/warming therapies per order  - Administer medications as ordered  - Instruct and encourage patient and family to use good hand hygiene technique  - Identify and instruct in appropriate isolation precautions for identified infection/condition  Outcome: Progressing     Problem: DISCHARGE PLANNING  Goal: Discharge to home or other facility with appropriate resources  Description: INTERVENTIONS:  - Identify barriers to discharge w/patient and caregiver  - Arrange for needed discharge resources and transportation as appropriate  - Identify discharge learning needs (meds, wound care, etc )  - Arrange for interpretive services to assist at discharge as needed  - Refer to Case Management Department for coordinating discharge planning if the patient needs post-hospital services based on physician/advanced practitioner order or complex needs related to functional status, cognitive ability, or social support system  Outcome: Progressing     Problem: Knowledge Deficit  Goal: Patient/family/caregiver demonstrates understanding of disease process, treatment plan, medications, and discharge instructions  Description: Complete learning assessment and assess knowledge base    Interventions:  - Provide teaching at level of understanding  - Provide teaching via preferred learning methods  Outcome: Progressing     Problem: GASTROINTESTINAL - ADULT  Goal: Minimal or absence of nausea and/or vomiting  Description: INTERVENTIONS:  - Administer IV fluids if ordered to ensure adequate hydration  - Maintain NPO status until nausea and vomiting are resolved  - Nasogastric tube if ordered  - Administer ordered antiemetic medications as needed  - Provide nonpharmacologic comfort measures as appropriate  - Advance diet as tolerated, if ordered  - Consider nutrition services referral to assist patient with adequate nutrition and appropriate food choices  Outcome: Progressing  Goal: Maintains or returns to baseline bowel function  Description: INTERVENTIONS:  - Assess bowel function  - Encourage oral fluids to ensure adequate hydration  - Administer IV fluids if ordered to ensure adequate hydration  - Administer ordered medications as needed  - Encourage mobilization and activity  - Consider nutritional services referral to assist patient with adequate nutrition and appropriate food choices  Outcome: Progressing  Goal: Maintains adequate nutritional intake  Description: INTERVENTIONS:  - Monitor percentage of each meal consumed  - Identify factors contributing to decreased intake, treat as appropriate  - Assist with meals as needed  - Monitor I&O, weight, and lab values if indicated  - Obtain nutrition services referral as needed  Outcome: Progressing     Problem: METABOLIC, FLUID AND ELECTROLYTES - ADULT  Goal: Electrolytes maintained within normal limits  Description: INTERVENTIONS:  - Monitor labs and assess patient for signs and symptoms of electrolyte imbalances  - Administer electrolyte replacement as ordered  - Monitor response to electrolyte replacements, including repeat lab results as appropriate  - Instruct patient on fluid and nutrition as appropriate  Outcome: Progressing     Problem: SKIN/TISSUE INTEGRITY - ADULT  Goal: Incision(s), wounds(s) or drain site(s) healing without S/S of infection  Description: INTERVENTIONS  - Assess and document dressing, incision, wound bed, drain sites and surrounding tissue  - Provide patient and family education  Outcome: Progressing

## 2023-01-31 NOTE — PROCEDURES
Insert PICC line    Date/Time: 1/30/2023 2:45 PM  Performed by: Marquise Shetty RN  Authorized by: Smita Nicholson MD     Patient location:  Bedside  Other Assisting Provider: Yes (comment) Palak Laura Infusion Tech)    Consent:     Consent obtained:  Written (Obtained by provider)    Consent given by:  Patient    Risks discussed:  Arterial puncture, bleeding, infection, incorrect placement, nerve damage and pneumothorax (Discussed by provider and PICC RN)    Alternatives discussed: Discussed by provider  Universal protocol:     Procedure explained and questions answered to patient or proxy's satisfaction: yes      Relevant documents present and verified: yes      Test results available and properly labeled: yes      Radiology Images displayed and confirmed  If images not available, report reviewed: yes      Required blood products, implants, devices, and special equipment available: yes      Site/side marked: yes      Immediately prior to procedure, a time out was called: yes      Patient identity confirmed:  Verbally with patient, arm band, provided demographic data and hospital-assigned identification number  Pre-procedure details:     Hand hygiene: Hand hygiene performed prior to insertion      Sterile barrier technique: All elements of maximal sterile technique followed      Skin preparation:  ChloraPrep    Skin preparation agent: Skin preparation agent completely dried prior to procedure    Indications:     PICC line indications: no peripheral vascular access    Sedation:     Sedation type: None  Anesthesia (see MAR for exact dosages): Anesthesia method:  Local infiltration    Local anesthetic:  Lidocaine 1% w/o epi (5 mL administered)  Procedure details:     Location:  Cephalic    Vessel type: vein      Laterality:  Left    Site selection rationale: Only accessible, visible vessel appropriate for bedside placement      Approach: percutaneous technique used      Patient position:  Flat    Procedural supplies:  Single lumen    Catheter size:  4 Fr    Landmarks identified: yes      Ultrasound guidance: yes      Ultrasound image availability:  Not saved    Sterile ultrasound techniques: Sterile gel and sterile probe covers were used      Number of attempts:  1    Successful placement: no      Cath access vessel: Unsuccessful placement  Pt insisted on termination of procedure  Comments:      Pt experienced significant anxiety pre and intra procedure  Pt requested to have procedure terminated while attempting to pass the wire, which was difficult d/t vasospasms  Procedure terminated upon pt's request and a PICC was not placed

## 2023-01-31 NOTE — PROGRESS NOTES
Progress Note - General Surgery  : IVAN Red Surgery Resident on Naz Black 60 y o  female MRN: [id number]  Unit/Bed#: [facility] 830-01 Encounter: [id number]      Assessment:  60 y o  female s/p 1/27 HILTON/BSO, component separation / IHR with phasix       Plan:  Regular diet  Abdominal binder  PRN analgesia, epidural out        Subjective: No acute complaints, nervous about moving/bending/stretching and damaging her repair      Objective:     Physical Exam:  GEN: NAD   Ab: Soft, min T/ND, MARCO ANTONIO light SS bilaterally  Lung: Normal effort   CV: RRR   Extrem: No CCE   Neuro: A+Ox3       I/O       01/29 0701  01/30 0700 01/30 0701  01/31 0700    P  O  1440 1720    I V  (mL/kg) 2197 3 (15 9) 311 5 (2 3)    Total Intake(mL/kg) 3637 3 (26 4) 2031 5 (14 7)    Urine (mL/kg/hr) 3600 (1 1) 1000 (0 3)    Drains 240 170    Stool  0    Total Output 3840 1170    Net -202 7 +861 5          Unmeasured Urine Occurrence  3 x    Unmeasured Stool Occurrence  2 x          Lab, Imaging and other studies: I have personally reviewed pertinent reports    , CBC with diff:   Lab Results   Component Value Date    WBC 9 30 01/31/2023    HGB 10 0 (L) 01/31/2023    HCT 34 5 (L) 01/31/2023    MCV 82 01/31/2023     01/31/2023    MCH 23 8 (L) 01/31/2023    MCHC 29 0 (L) 01/31/2023    RDW 16 0 (H) 01/31/2023    MPV 8 8 (L) 01/31/2023   , BMP/CMP:   Lab Results   Component Value Date    SODIUM 140 01/31/2023    K 3 6 01/31/2023     (H) 01/31/2023    CO2 27 01/31/2023    BUN 8 01/31/2023    CREATININE 0 63 01/31/2023    CALCIUM 8 7 01/31/2023    EGFR 98 01/31/2023             Kaylee Azevedo MD  1/31/2023 6:03 AM

## 2023-01-31 NOTE — PROGRESS NOTES
Progress Note - Acute Pain Service    Danna Crowe 60 y o  female MRN: [id number]  Unit/Bed#: [facility] 830-01 Encounter: [id number]      Assessment:   Principal Problem:    S/P HILTON-BSO (total abdominal hysterectomy and bilateral salpingo-oophorectomy)    Danna Crowe is a 60 y o  female with PMHx of morbid obesity (BMI 64) who is s/p HILTON-BSO on 1/27  A perioperative thoracic epidural was placed for post-operative analgesia s/p removal on 1/30  APS consulted for post-operative pain control  Upon bedside evaluation, Erika's pain was well controlled on current MMA  Able to get OOB and ambulate with assistance  Tolerating PO  Denies opioid-induced side effects including nausea/vomiting/itching/constipation  Plan:   - oxycodone 5 mg/10 mg PO q4hrs PRN for moderate/severe pain    Multimodal analgesia:  - - Tylenol 975 mg PO q8hrs standing  - Robaxin 500 mg PO q6hrs     Bowel Regimen:  - Docusate (Colace) 100 mg PO twice daily  - Senna 1 tablet PO qhs  - Polyethylene glycol (Miralax) 17g PO once daily PRN    APS will sign off at this time  Thank you for the consult  All opioids and other analgesics to be written at discretion of primary team  Please contact Acute Pain Service - [facility] via [employer] from [phone] with additional questions or concerns  See Pan or Sergo for additional contacts and after hours information  Pain History  Current pain location(s): ABdomen  Pain Scale:   0-6/10  Quality: Diffuse, achy  24 hour history: See above    Opioid requirement previous 24 hours: PO oxycodone 15mg    Meds/Allergies   all current active meds have been reviewed    No Known Allergies    Objective     Temp:  [97 4 °F (36 3 °C)-97 9 °F (36 6 °C)] 97 7 °F (36 5 °C)  Resp:  [16-17] 16  BP: (128-150)/(79-85) 146/83    Physical Exam  Constitutional:       Appearance: Normal appearance  She is obese  HENT:      Head: Normocephalic  Mouth/Throat:      Mouth: Mucous membranes are dry     Eyes:      Pupils: Pupils are equal, round, and reactive to light  Cardiovascular:      Rate and Rhythm: Normal rate  Pulses: Normal pulses  Pulmonary:      Effort: Pulmonary effort is normal    Abdominal:      Palpations: Abdomen is soft  Musculoskeletal:         General: Normal range of motion  Skin:     General: Skin is dry  Neurological:      General: No focal deficit present  Mental Status: She is alert and oriented to person, place, and time  Psychiatric:         Mood and Affect: Mood normal          Lab Results:   Results from last 7 days   Lab Units 01/31/23  0439   WBC Thousand/uL 9 30   HEMOGLOBIN g/dL 10 0*   HEMATOCRIT % 34 5*   PLATELETS Thousands/uL 295      Results from last 7 days   Lab Units 01/31/23  0439   POTASSIUM mmol/L 3 6   CHLORIDE mmol/L 109*   CO2 mmol/L 27   BUN mg/dL 8   CREATININE mg/dL 0 63   CALCIUM mg/dL 8 7       Imaging Studies: I have personally reviewed pertinent reports  EKG, Pathology, and Other Studies: I have personally reviewed pertinent reports  Counseling / Coordination of Care  Total floor / unit time spent today 20 minutes  Greater than 50% of total time was spent with the patient and / or family counseling and / or coordination of care  Please note that the APS provides consultative services regarding pain management only  With the exception of ketamine and epidural infusions and except when indicated, final decisions regarding starting or changing doses of analgesic medications are at the discretion of the consulting service  Off hours consultation and/or medication management is generally not available      Jorge Luis Peters MD  Acute Pain Service

## 2023-01-31 NOTE — OCCUPATIONAL THERAPY NOTE
Occupational Therapy Evaluation     Patient Name: Juli Spivey  FACWQ'G Date: 1/31/2023  Problem List  Principal Problem:    S/P HILTON-BSO (total abdominal hysterectomy and bilateral salpingo-oophorectomy)    Past Medical History  Past Medical History:   Diagnosis Date    Acute hypoxemic respiratory failure due to COVID-19 [facility])     trached-trachael damage-resection  damage to liver feeding tube   renal failure-dialysis    History of renal failure     s/p sepsis and severe covid, was on dialysis    History of transfusion     Pt unsure but thinks they gave her blood    Primary hypertension      Past Surgical History  Past Surgical History:   Procedure Laterality Date    CARPAL TUNNEL RELEASE      EXPLORATORY LAPAROTOMY      HYSTERECTOMY N/A 1/27/2023    Procedure: HYSTERECTOMY TOTAL ABDOMINAL (HILTON), BILATERAL SALPINGO-OOPHORECTOMY, WITH EXTENSIVE LYSIS OF ADHESIONS;  Surgeon: Jose Esteban MD;  [facility];  Service: Gynecology Oncology    INCISIONAL HERNIA REPAIR N/A 1/27/2023    Procedure: [place] WITH PHASIX MESH;  Surgeon: Nazario Hale MD;  Location: [facility];  Service: General    PEG TUBE PLACEMENT      and removed    MS HYSTEROSCOPY BX ENDOMETRIUM&/POLYPC W/WO D&C N/A 10/17/2022    Procedure: DILATATION AND CURETTAGE (D&C) WITH HYSTEROSCOPY;  Surgeon: Jose Esteban MD;  [facility];  Service: Gynecology Oncology    MS MUSC MYOCUTANEOUS/FASCIOCUTANEOUS FLAP TRUNK N/A 1/27/2023    Procedure: COMPONENT SEPARATION;  Surgeon: Nazario Hale MD;  [facility]: [facility];  Service: General    REVISION TRACHEOSTOMY SCAR      TRACHEOSTOMY      TUMOR REMOVAL Left     upper arm at age 11, benign           01/31/23 1130   OT Last Visit   OT Visit Date 01/31/23   Note Type   Note type Evaluation   Pain Assessment   Pain Assessment Tool 0-10   Pain Score 4   Pain Location/Orientation Orientation: Bilateral;Location: Abdomen   Hospital Pain Intervention(s) Ambulation/increased activity;Repositioned   Restrictions/Precautions   Weight Bearing Precautions Per Order No   Other Precautions Multiple lines; Fall Risk;Pain  (x2 MARCO ANTONIO)   Home Living   Type of Home Other (Comment)  (Condo)   Home Layout One level   Bathroom Shower/Tub Tub/shower unit   [address] [place] [address]  chair;Commode;Grab bars around toilet  (denies use pta)   Home Equipment Walker;Cane;Reacher;Sock aid; Other (Comment)  (LH sponge)   Additional Comments Pt lives alone in a condo with 1 TIFFANIE  Prior Function   Level of [place] Independent with ADLs; Independent with functional mobility; Independent with IADLS   Lives With (S)  Alone   Receives Help From Family  (Supportive brother/friends)   IADLs Independent with driving; Independent with meal prep; Independent with medication management   Falls in the last 6 months 0   Comments PTA, Pt reports being I with ADLs/IADLs/no AD/ +  Pt supportives supportive family/friends able to assist upon DC  Lifestyle   Autonomy I with ADLs/IADLs/no AD/ +  Reciprocal Relationships Supportive friends/family   Intrinsic Gratification Enjoys spending time with friends   ADL   Where Assessed Chair   Eating Assistance 7  Independent   Grooming Assistance 5  Supervision/Setup   UB Bathing Assistance 5  Supervision/Setup   LB Bathing Assistance 4  Minimal Assistance   UB Dressing Assistance 5  [address] Deficit Setup;Grab bar use; Other (Comment)  (std toilet)   Functional Assistance 5  Supervision/Setup   Additional Comments Pt educated on use of hit kit items for use of LB dressing PRN and Pt states she does not need a review of LHAE  Pt feels confident in LB dressing upon DC home  Pt states her main concern is hygiene s/p toilet  Trialed many techniques seated, however, G success with standing and unilateral support of GB  Pt denies need for toilet aid  Pt engages in toileting routine (transfer, clothing management, and hygiene) at S level  Pt states she has a GB at home next to the toilet and feels comfortable performing hygiene and with no further acute OT concerns  Bed Mobility   Supine to Sit Unable to assess   Sit to Supine Unable to assess   Additional Comments Pt greeted OOB in recliner chair  Pt states she plans on sleeping in recliner at home  Transfers   Sit to Stand 5  Supervision   Additional items Increased time required   Stand to Sit 5  Supervision   Additional items Increased time required   Additional Comments with RW   Functional Mobility   Functional Mobility 5  Supervision   Additional Comments S with functional mobility with RW- within room distances   Additional items Rolling walker   Balance   Static Sitting Good   Dynamic Sitting Fair +   Static Standing Fair   Dynamic Standing Fair -   Ambulatory Fair -   Activity Tolerance   Activity Tolerance Patient tolerated treatment well   Medical Staff Made Aware CM UPDATED  Nurse Made Aware RN cleared/updated  RUE Assessment   RUE Assessment WFL   LUE Assessment   LUE Assessment WFL   Hand Function   Gross Motor Coordination Functional   Fine Motor Coordination Functional   Sensation   Light Touch No apparent deficits   Psychosocial   Psychosocial (WDL) WDL   Cognition   Overall Cognitive Status WFL   Arousal/Participation Alert; Responsive   Attention Within functional limits   Orientation Level Oriented X4   Memory Within functional limits   Following Commands Follows all commands and directions without difficulty   Comments Pt very pleasant and cooperative during OT session  Assessment   Limitation Decreased high-level ADLs; Decreased endurance   Prognosis Fair   Assessment Pt is a 59 yo Female who presented to [facility] on 1/27/2023 with postmenopausal bleeding and incisional hernia with out obstruction or gangrene   Pt s/p HILTON-BSO, abdominal wall reconstruction with component separation and mesh on 1/27/2023  Pt  has a past medical history of Acute hypoxemic respiratory failure due to COVID-19 [facility]), History of renal failure, History of transfusion, and Primary hypertension  Pt greeted bedside for OT evaluation on 1/31/2023  Pt lives alone in a condo with 1 TIFFANIE  PTA, Pt reports being I with ADLs/IADLs/no AD/ +  Pt supportives supportive family/friends able to assist upon DC  Pt demonstrating the following occupational deficits: S with UB ADLs, min A with LB ADLs, S with toileting, S with functional transfers, and S with functional mobility with RW  Pt with concerns about toileting hygiene upon DC and reviewed/educated Pt and answered all questions  Pt encouraged to participate in ADLs/functional mobility with nursing/restorative staff during hospitalization  Pt with no further acute OT concerns  Pt would benefit from returning home with increased social support upon DC to maximize safety and independence with ADLs and functional tasks of choice  DC skilled OT services  Goals   Patient Goals To go home when she feels ready  Plan   OT Frequency Eval only   Recommendation   OT Discharge Recommendation No rehabilitation needs   Equipment Recommended Shower/Tub chair with back ($);Hip Kit ($)  (cont  use of SC and hit kit items prn upon DC)   Additional Comments  The patient's raw score on the AM-PAC Daily Activity inpatient short form is 20, standardized score is 42 03, greater than 39 4  Patients at this level are likely to benefit from discharge to home  Please refer to the recommendation of the Occupational Therapist for safe discharge planning     AM-PAC Daily Activity Inpatient   Lower Body Dressing 3   Bathing 3   Toileting 3   Upper Body Dressing 3   Grooming 4   Eating 4   Daily Activity Raw Score 20   Daily Activity Standardized Score (Calc for Raw Score >=11) 42 03   AM-PAC Applied Cognition Inpatient   Following a Speech/Presentation 4   Understanding Ordinary Conversation 4   Taking Medications 4   Remembering Where Things Are Placed or Put Away 4   Remembering List of 4-5 Errands 4   Taking Care of Complicated Tasks 4   Applied Cognition Raw Score 24   Applied Cognition Standardized Score 62 21   End of Consult   Education Provided Yes   Patient Position at End of Consult Bedside chair; All needs within reach   Nurse Communication Nurse aware of consult       Lupe David MS, OTR/L

## 2023-01-31 NOTE — RESTORATIVE TECHNICIAN NOTE
Restorative Technician Note      Patient Name: Treva Yanes     Restorative Tech Visit Date: 01/31/23  Note Type: Mobility  Patient Position Upon Consult: Supine  Activity Performed: Ambulated  Assistive Device: Standard walker  Education Provided: Yes  Patient Position at End of Consult: Bedside chair;  All needs within reach    Arty He  DPT, Restorative Technician

## 2023-01-31 NOTE — PROGRESS NOTES
Pastoral Care Progress Note    2023  Patient: Teresa Rodriguez : 1965  Admission Date & Time: 2023 6038  MRN: [id number] CSN: [id number]        Brief rounding visit w/ Pt, who was glad for good care/progress, yet also admitted she just wants to go home  Commisserated with her, expressed sympathy, encouraged hope, and prayed for her healing

## 2023-01-31 NOTE — QUICK NOTE
Request for IR PICC placement  Patient currently with right IJ triple lumen central line  Patient underwent attempted PICC line placement yesterday  Per note, patient was very anxious and asked for termination of procedure  Patient currently with working access  Do not believe there is an increased risk of infection with an IJ temporary triple lumen catheter vs picc line  Would suggest continued use of IJ central line at this time as it is functioning and patient did not tolerate attempt at PICC line placement yesterday  IR would also require patient to lay flat for PICC line placement on fluoroscopy table which patient was unable to tolerate yesterday  Will cancel IR PICC line consult  Discussed with Dr Pb Peters PA-C

## 2023-01-31 NOTE — PROGRESS NOTES
For questions/concerns on this patient, please reach out to the following:  [facility] GYN-GynOnc- Resident/AP  Gyn Oncology Progress note   Julio Morrissey 60 y o  female MRN: [id number]  Unit/Bed#: [facility] 830-01 Encounter: [id number]    Assessment/Plan:    60 y o  POD# 4 from ex-lap, HILTON, BSO, hernia repair with phasix mesh    * S/P HILTON-BSO (total abdominal hysterectomy and bilateral salpingo-oophorectomy)  Assessment & Plan  · FEN: regular diet, replete K and Mg today  · NEURO/PAIN: hung Tylenol/robaxin, prn oxy 5/10  · CV: home metoprolol ordered  · HEME: daily labs  · : koo catheter removed, voiding  · ENDO: home liothyronine ordered  · PPx: heparin 7500 U TID, OOB, ICS   · Appreciate PT/OT recs  · Gen surg following, appreciate recs  · Lines/drains: R IJ (1/27- ), MARCO ANTONIO x2  · Unable to place PICC at bedside yesterday  Currently scheduled for PICC placement/ R IJ removal with IR today  Currently clinically stable, consider IJ removal without PICC placement        Anticipate discharge POD#5      Subjective:    Julio Morrissey has no current complaints  Pain is controlled with current regimen  Patient is currently voiding  She is ambulating  Patient is currently passing flatus and has had no bowel movement  She is tolerating PO, and denies nausea or vomiting  Patient denies fever, chills, chest pain, shortness of breath, or calf tenderness  Objective:  /83   Pulse 75   Temp (!) 97 4 °F (36 3 °C)   Resp 17   Ht 5' 2" (1 575 m)   Wt (!) 138 kg (305 lb)   SpO2 94%   BMI 55 79 kg/m²     I/O last 3 completed shifts: In: 3149 7 [P O :1720; I V :1429 7]  Out: 3750 [Urine:3400; Drains:350]  No intake/output data recorded      Lab Results   Component Value Date    WBC 9 30 01/31/2023    HGB 10 0 (L) 01/31/2023    HCT 34 5 (L) 01/31/2023    MCV 82 01/31/2023     01/31/2023       Lab Results   Component Value Date    SODIUM 140 01/31/2023    K 3 6 01/31/2023     (H) 01/31/2023    CO2 27 01/31/2023    BUN 8 01/31/2023    CREATININE 0 63 01/31/2023    GLUC 133 01/31/2023    CALCIUM 8 7 01/31/2023           Physical Exam  Constitutional:       General: She is not in acute distress  Appearance: Normal appearance  She is well-developed  She is obese  She is not ill-appearing, toxic-appearing or diaphoretic  HENT:      Head: Normocephalic and atraumatic  Cardiovascular:      Rate and Rhythm: Normal rate  Pulmonary:      Effort: Pulmonary effort is normal  No respiratory distress  Abdominal:      Palpations: Abdomen is soft  Tenderness: There is no abdominal tenderness  Comments: Incision c/d/i  B/l MARCO ANTONIO drains with minimal SS output   Skin:     General: Skin is warm and dry  Neurological:      General: No focal deficit present  Mental Status: She is alert and oriented to person, place, and time     Psychiatric:         Mood and Affect: Mood normal          Behavior: Behavior normal            Yaron Gallardo MD  1/31/2023  7:09 AM

## 2023-02-01 LAB
ANION GAP SERPL CALCULATED.3IONS-SCNC: 6 MMOL/L (ref 4–13)
BUN SERPL-MCNC: 10 MG/DL (ref 5–25)
CALCIUM SERPL-MCNC: 9.1 MG/DL (ref 8.3–10.1)
CHLORIDE SERPL-SCNC: 107 MMOL/L (ref 96–108)
CO2 SERPL-SCNC: 27 MMOL/L (ref 21–32)
CREAT SERPL-MCNC: 0.56 MG/DL (ref 0.6–1.3)
ERYTHROCYTE [DISTWIDTH] IN BLOOD BY AUTOMATED COUNT: 16.2 % (ref 11.6–15.1)
GFR SERPL CREATININE-BSD FRML MDRD: 102 ML/MIN/1.73SQ M
GLUCOSE SERPL-MCNC: 89 MG/DL (ref 65–140)
GLUCOSE SERPL-MCNC: 98 MG/DL (ref 65–140)
HCT VFR BLD AUTO: 36.4 % (ref 34.8–46.1)
HGB BLD-MCNC: 10.5 G/DL (ref 11.5–15.4)
MAGNESIUM SERPL-MCNC: 1.9 MG/DL (ref 1.6–2.6)
MCH RBC QN AUTO: 23.9 PG (ref 26.8–34.3)
MCHC RBC AUTO-ENTMCNC: 28.8 G/DL (ref 31.4–37.4)
MCV RBC AUTO: 83 FL (ref 82–98)
PLATELET # BLD AUTO: 304 THOUSANDS/UL (ref 149–390)
PMV BLD AUTO: 8.7 FL (ref 8.9–12.7)
POTASSIUM SERPL-SCNC: 3.8 MMOL/L (ref 3.5–5.3)
RBC # BLD AUTO: 4.4 MILLION/UL (ref 3.81–5.12)
SODIUM SERPL-SCNC: 140 MMOL/L (ref 135–147)
WBC # BLD AUTO: 9.57 THOUSAND/UL (ref 4.31–10.16)

## 2023-02-01 RX ORDER — POTASSIUM CHLORIDE 20 MEQ/1
20 TABLET, EXTENDED RELEASE ORAL ONCE
Status: COMPLETED | OUTPATIENT
Start: 2023-02-01 | End: 2023-02-01

## 2023-02-01 RX ORDER — LANOLIN ALCOHOL/MO/W.PET/CERES
400 CREAM (GRAM) TOPICAL ONCE
Status: COMPLETED | OUTPATIENT
Start: 2023-02-01 | End: 2023-02-01

## 2023-02-01 RX ADMIN — POLYETHYLENE GLYCOL 3350 17 G: 17 POWDER, FOR SOLUTION ORAL at 09:08

## 2023-02-01 RX ADMIN — METOPROLOL TARTRATE 50 MG: 50 TABLET, FILM COATED ORAL at 21:20

## 2023-02-01 RX ADMIN — OXYCODONE HYDROCHLORIDE 5 MG: 5 TABLET ORAL at 11:52

## 2023-02-01 RX ADMIN — METHOCARBAMOL 500 MG: 500 TABLET ORAL at 13:33

## 2023-02-01 RX ADMIN — ACETAMINOPHEN 975 MG: 325 TABLET ORAL at 05:23

## 2023-02-01 RX ADMIN — ACETAMINOPHEN 975 MG: 325 TABLET ORAL at 21:20

## 2023-02-01 RX ADMIN — ACETAMINOPHEN 975 MG: 325 TABLET ORAL at 13:32

## 2023-02-01 RX ADMIN — LEVOTHYROXINE SODIUM 100 MCG: 100 TABLET ORAL at 09:08

## 2023-02-01 RX ADMIN — OXYCODONE HYDROCHLORIDE 5 MG: 5 TABLET ORAL at 01:20

## 2023-02-01 RX ADMIN — METOPROLOL TARTRATE 50 MG: 50 TABLET, FILM COATED ORAL at 09:08

## 2023-02-01 RX ADMIN — HEPARIN SODIUM 7500 UNITS: 5000 INJECTION INTRAVENOUS; SUBCUTANEOUS at 21:20

## 2023-02-01 RX ADMIN — DOCUSATE SODIUM 100 MG: 100 CAPSULE, LIQUID FILLED ORAL at 09:08

## 2023-02-01 RX ADMIN — HEPARIN SODIUM 7500 UNITS: 5000 INJECTION INTRAVENOUS; SUBCUTANEOUS at 05:23

## 2023-02-01 RX ADMIN — MAGNESIUM OXIDE TAB 400 MG (241.3 MG ELEMENTAL MG) 400 MG: 400 (241.3 MG) TAB at 09:08

## 2023-02-01 RX ADMIN — HEPARIN SODIUM 7500 UNITS: 5000 INJECTION INTRAVENOUS; SUBCUTANEOUS at 13:31

## 2023-02-01 RX ADMIN — LIOTHYRONINE SODIUM 5 MCG: 5 TABLET ORAL at 09:09

## 2023-02-01 RX ADMIN — POTASSIUM CHLORIDE 20 MEQ: 1500 TABLET, EXTENDED RELEASE ORAL at 09:08

## 2023-02-01 RX ADMIN — METHOCARBAMOL 500 MG: 500 TABLET ORAL at 05:23

## 2023-02-01 RX ADMIN — METHOCARBAMOL 500 MG: 500 TABLET ORAL at 21:19

## 2023-02-01 NOTE — PLAN OF CARE
Problem: Prexisting or High Potential for Compromised Skin Integrity  Goal: Skin integrity is maintained or improved  Description: INTERVENTIONS:  - Identify patients at risk for skin breakdown  - Assess and monitor skin integrity  - Assess and monitor nutrition and hydration status  - Monitor labs   - Assess for incontinence   - Turn and reposition patient  - Assist with mobility/ambulation  - Relieve pressure over bony prominences  - Avoid friction and shearing  - Provide appropriate hygiene as needed including keeping skin clean and dry  - Evaluate need for skin moisturizer/barrier cream  - Collaborate with interdisciplinary team   - Patient/family teaching  - Consider wound care consult   Outcome: Progressing     Problem: PAIN - ADULT  Goal: Verbalizes/displays adequate comfort level or baseline comfort level  Description: Interventions:  - Encourage patient to monitor pain and request assistance  - Assess pain using appropriate pain scale  - Administer analgesics based on type and severity of pain and evaluate response  - Implement non-pharmacological measures as appropriate and evaluate response  - Consider cultural and social influences on pain and pain management  - Notify physician/advanced practitioner if interventions unsuccessful or patient reports new pain  Outcome: Progressing     Problem: INFECTION - ADULT  Goal: Absence or prevention of progression during hospitalization  Description: INTERVENTIONS:  - Assess and monitor for signs and symptoms of infection  - Monitor lab/diagnostic results  - Monitor all insertion sites, i e  indwelling lines, tubes, and drains  - Monitor endotracheal if appropriate and nasal secretions for changes in amount and color  - [place] appropriate cooling/warming therapies per order  - Administer medications as ordered  - Instruct and encourage patient and family to use good hand hygiene technique  - Identify and instruct in appropriate isolation precautions for identified infection/condition  Outcome: Progressing     Problem: SKIN/TISSUE INTEGRITY - ADULT  Goal: Incision(s), wounds(s) or drain site(s) healing without S/S of infection  Description: INTERVENTIONS  - Assess and document dressing, incision, wound bed, drain sites and surrounding tissue  - Provide patient and family education  Outcome: Progressing

## 2023-02-01 NOTE — PROGRESS NOTES
For questions/concerns on this patient, please reach out to the following:  SLB-OB GYN-GynOnc- Resident/AP  Gyn Oncology Progress note   Vinay Calles 60 y o  female MRN: [id number]  Unit/Bed#: [facility] 830-01 Encounter: [id number]    Assessment/Plan:    60 y o  POD# 5 from ex-lap, HILTON, BSO, hernia repair with phasix mesh    * S/P HILTON-BSO (total abdominal hysterectomy and bilateral salpingo-oophorectomy)  Assessment & Plan  · FEN: regular diet, replete K and Mg today  · NEURO/PAIN: hung Tylenol/robaxin, prn oxy 5/10  · CV: home metoprolol ordered  · HEME: daily labs  · : koo catheter removed, voiding  · ENDO: home levythyroxine & liothyronine ordered  · PPx: heparin 7500 U TID, OOB, ICS   · Appreciate PT/OT recs  · Gen surg following, appreciate recs  · Lines/drains: R IJ (1/27- ), MARCO ANTONIO x2  · Unable to place PICC at bedside yesterday  R IJ to remain in place for now  Subjective:    Vinay Calles is doing well this morning  Pain is well controlled with oral analgesics  Patient is currently voiding but complaining of dysuria  She is ambulating  Patient is currently passing flatus and has had bowel movement  She is tolerating PO, and denies nausea or vomitting  Patient denies fever, chills, chest pain, shortness of breath, or calf tenderness  Objective:  /75   Pulse 83   Temp (!) 97 3 °F (36 3 °C)   Resp 16   Ht 5' 2" (1 575 m)   Wt (!) 138 kg (305 lb)   SpO2 97%   BMI 55 79 kg/m²     I/O last 3 completed shifts: In: 2031 5 [P O :1720; I V :311 5]  Out: 1190 [Urine:1000; Drains:190]  I/O this shift:   In: 0   Out: 170 [Drains:170]    Lab Results   Component Value Date    WBC 9 57 02/01/2023    HGB 10 5 (L) 02/01/2023    HCT 36 4 02/01/2023    MCV 83 02/01/2023     02/01/2023       Lab Results   Component Value Date    CALCIUM 9 1 02/01/2023    K 3 8 02/01/2023    CO2 27 02/01/2023     02/01/2023    BUN 10 02/01/2023    CREATININE 0 56 (L) 02/01/2023           Physical Exam  Vitals reviewed  Constitutional:       General: She is not in acute distress  Appearance: She is obese  She is not toxic-appearing  HENT:      Head: Atraumatic  Eyes:      Extraocular Movements: Extraocular movements intact  Cardiovascular:      Rate and Rhythm: Normal rate and regular rhythm  Pulses: Normal pulses  Pulmonary:      Effort: Pulmonary effort is normal  No respiratory distress  Abdominal:      Palpations: Abdomen is soft  Tenderness: There is no guarding  Comments: Incision c/d/i  MARCO ANTONIO drains: R - SS, L - serous   Musculoskeletal:         General: Normal range of motion  Cervical back: Normal range of motion  Skin:     General: Skin is warm and dry  Neurological:      General: No focal deficit present  Mental Status: She is alert  Mental status is at baseline     Psychiatric:         Mood and Affect: Mood normal          Behavior: Behavior normal          Wetzel Phalen, MD  2/1/2023  6:35 AM

## 2023-02-01 NOTE — CASE MANAGEMENT
Case Management Discharge Planning Note    Patient name Yessica Anthony  Location [address] [place] [address] [address] 830-01 MRN [id number]  : 1965 Date 2023       Current Admission Date: 2023  Current Admission Diagnosis:S/P HILTON-BSO (total abdominal hysterectomy and bilateral salpingo-oophorectomy)   Patient Active Problem List    Diagnosis Date Noted   • Chronic endometritis 2022   • Morbid obesity with BMI of 45 0-49 9, adult ([facility] ) 2022   • S/P HILTON-BSO (total abdominal hysterectomy and bilateral salpingo-oophorectomy) 2022   • Tracheal stenosis due to tracheostomy ([facility] ) 2022   • Acquired hypothyroidism 2022   • Ventral hernia without obstruction or gangrene 2022      LOS (days): 5  Geometric Mean LOS (GMLOS) (days): 2 80  Days to GMLOS:-2 3     OBJECTIVE:  Risk of Unplanned Readmission Score: 9 59         Current admission status: Inpatient   Preferred Pharmacy:   [address] [place] [address], [address] [place] [address]  Phone: [phone] Fax: [address], [address] [facility] 91 210 [facility]  Phone: [phone] Fax: [phone]    Primary Care Provider: Marylin Mathis    Primary Insurance: MEDICARE  Secondary Insurance: Lakeisha Ortega    DISCHARGE DETAILS:                                          Other Referral/Resources/Interventions Provided:  Referral Comments: TT from gyn-onc resident & informed pt will likely go home tomorrow with her drains  Asked cm to set up vna   CM s/w pt & she declines vna                                              IMM Given (Date):: 23  IMM Given to[de-identified] Patient

## 2023-02-01 NOTE — RESTORATIVE TECHNICIAN NOTE
Restorative Technician Note      Patient Name: Jillian Antunez     Restorative Tech Visit Date: 02/01/23  Note Type: Mobility  Patient Position Upon Consult: Bedside chair  Activity Performed: Ambulated  Assistive Device: Standard walker  Education Provided: Yes  Patient Position at End of Consult: Bedside chair;  All needs within reach    Jose Mina  DPT, Restorative Technician

## 2023-02-01 NOTE — PLAN OF CARE
Problem: Prexisting or High Potential for Compromised Skin Integrity  Goal: Skin integrity is maintained or improved  Description: INTERVENTIONS:  - Identify patients at risk for skin breakdown  - Assess and monitor skin integrity  - Assess and monitor nutrition and hydration status  - Monitor labs   - Assess for incontinence   - Turn and reposition patient  - Assist with mobility/ambulation  - Relieve pressure over bony prominences  - Avoid friction and shearing  - Provide appropriate hygiene as needed including keeping skin clean and dry  - Evaluate need for skin moisturizer/barrier cream  - Collaborate with interdisciplinary team   - Patient/family teaching  - Consider wound care consult   Outcome: Progressing     Problem: PAIN - ADULT  Goal: Verbalizes/displays adequate comfort level or baseline comfort level  Description: Interventions:  - Encourage patient to monitor pain and request assistance  - Assess pain using appropriate pain scale  - Administer analgesics based on type and severity of pain and evaluate response  - Implement non-pharmacological measures as appropriate and evaluate response  - Consider cultural and social influences on pain and pain management  - Notify physician/advanced practitioner if interventions unsuccessful or patient reports new pain  Outcome: Progressing     Problem: INFECTION - ADULT  Goal: Absence or prevention of progression during hospitalization  Description: INTERVENTIONS:  - Assess and monitor for signs and symptoms of infection  - Monitor lab/diagnostic results  - Monitor all insertion sites, i e  indwelling lines, tubes, and drains  - Monitor endotracheal if appropriate and nasal secretions for changes in amount and color  - [place] appropriate cooling/warming therapies per order  - Administer medications as ordered  - Instruct and encourage patient and family to use good hand hygiene technique  - Identify and instruct in appropriate isolation precautions for identified infection/condition  Outcome: Progressing     Problem: DISCHARGE PLANNING  Goal: Discharge to home or other facility with appropriate resources  Description: INTERVENTIONS:  - Identify barriers to discharge w/patient and caregiver  - Arrange for needed discharge resources and transportation as appropriate  - Identify discharge learning needs (meds, wound care, etc )  - Arrange for interpretive services to assist at discharge as needed  - Refer to Case Management Department for coordinating discharge planning if the patient needs post-hospital services based on physician/advanced practitioner order or complex needs related to functional status, cognitive ability, or social support system  Outcome: Progressing     Problem: Knowledge Deficit  Goal: Patient/family/caregiver demonstrates understanding of disease process, treatment plan, medications, and discharge instructions  Description: Complete learning assessment and assess knowledge base    Interventions:  - Provide teaching at level of understanding  - Provide teaching via preferred learning methods  Outcome: Progressing     Problem: GASTROINTESTINAL - ADULT  Goal: Minimal or absence of nausea and/or vomiting  Description: INTERVENTIONS:  - Administer IV fluids if ordered to ensure adequate hydration  - Maintain NPO status until nausea and vomiting are resolved  - Nasogastric tube if ordered  - Administer ordered antiemetic medications as needed  - Provide nonpharmacologic comfort measures as appropriate  - Advance diet as tolerated, if ordered  - Consider nutrition services referral to assist patient with adequate nutrition and appropriate food choices  Outcome: Progressing  Goal: Maintains or returns to baseline bowel function  Description: INTERVENTIONS:  - Assess bowel function  - Encourage oral fluids to ensure adequate hydration  - Administer IV fluids if ordered to ensure adequate hydration  - Administer ordered medications as needed  - Encourage mobilization and activity  - Consider nutritional services referral to assist patient with adequate nutrition and appropriate food choices  Outcome: Progressing  Goal: Maintains adequate nutritional intake  Description: INTERVENTIONS:  - Monitor percentage of each meal consumed  - Identify factors contributing to decreased intake, treat as appropriate  - Assist with meals as needed  - Monitor I&O, weight, and lab values if indicated  - Obtain nutrition services referral as needed  Outcome: Progressing     Problem: METABOLIC, FLUID AND ELECTROLYTES - ADULT  Goal: Electrolytes maintained within normal limits  Description: INTERVENTIONS:  - Monitor labs and assess patient for signs and symptoms of electrolyte imbalances  - Administer electrolyte replacement as ordered  - Monitor response to electrolyte replacements, including repeat lab results as appropriate  - Instruct patient on fluid and nutrition as appropriate  Outcome: Progressing     Problem: SKIN/TISSUE INTEGRITY - ADULT  Goal: Incision(s), wounds(s) or drain site(s) healing without S/S of infection  Description: INTERVENTIONS  - Assess and document dressing, incision, wound bed, drain sites and surrounding tissue  - Provide patient and family education  Outcome: Progressing

## 2023-02-01 NOTE — PROGRESS NOTES
Progress Note - General Surgery   Diana Mention 60 y o  female MRN: [id number]  Unit/Bed#: [facility] 830-01 Encounter: [id number]      Assessment:  62yo F POD4 s/p ex-lap, HILTON/BSO, and ventral hernia repair w/ component separation  Afebrile, vitals WNL, and saturating adequately on room air  L MARCO ANTONIO: 70cc/24hrs serosanguineous  R MARCO ANTONIO: 100cc/24hrs serosanguineous    Plan:  - OK for discharge from gen surg perspective  - Continue drains/staples until follow-up  - Drain teachings per nursing  - Multimodal analgesia  - OK for OOB/ambulation and regular diet    Subjective/Objective     Subjective:   No acute events overnight  This AM the patient reported only mild-moderate abdominal pain w/ movement but no fevers/chills, nausea/emesis, or dyspnea  OOB/ambulating  Objective:     Blood pressure 156/75, pulse 83, temperature (!) 97 3 °F (36 3 °C), resp  rate 16, height 5' 2" (1 575 m), weight (!) 138 kg (305 lb), SpO2 97 %  ,Body mass index is 55 79 kg/m²  Gen: Awake, alert, and in no acute distress  Head: Normocephalic, atraumatic  Neck: No obvious JVD, trachea midline  Cardiovascular: Regular rate  Respiratory:  In no acute respiratory distress w/ no use of accessory muscles of respiration  Abd: Soft and appropriately tender to palpation overlying laparotomy closed w/ staples w/ no signs of infection nor any guarding/rigidity or signs of peritonitis; B/L JPs w/ serosanguineous fluid in collection  Extremities: No visible wounds/ulcerations to the B/L upper/lower extremities  Skin: Warm/dry  Psychiatric: Appropriate mood/affect    Intake/Output Summary (Last 24 hours) at 2/1/2023 0644  Last data filed at 2/1/2023 0515  Gross per 24 hour   Intake 0 ml   Output 170 ml   Net -170 ml       Invasive Devices     Peripherally Inserted Central Catheter Line  Duration           PICC Line 01/31/23 <1 day          Central Venous Catheter Line  Duration           CVC Central Lines Triple Right Internal jugular -- days    CVC Central Lines 01/27/23 Triple 4 days          Drain  Duration           Closed/Suction Drain Left Abdomen Bulb 10 Fr  4 days    Closed/Suction Drain Right Abdomen Bulb 10 Fr  4 days                I have personally reviewed pertinent lab results    , CBC:   Lab Results   Component Value Date    WBC 9 57 02/01/2023    HGB 10 5 (L) 02/01/2023    HCT 36 4 02/01/2023    MCV 83 02/01/2023     02/01/2023    MCH 23 9 (L) 02/01/2023    MCHC 28 8 (L) 02/01/2023    RDW 16 2 (H) 02/01/2023    MPV 8 7 (L) 02/01/2023   , CMP:   Lab Results   Component Value Date    SODIUM 140 02/01/2023    K 3 8 02/01/2023     02/01/2023    CO2 27 02/01/2023    BUN 10 02/01/2023    CREATININE 0 56 (L) 02/01/2023    CALCIUM 9 1 02/01/2023    EGFR 102 02/01/2023   , Coagulation: No results found for: PT, INR, APTT, Urinalysis: No results found for: COLORU, CLARITYU, SPECGRAV, PHUR, LEUKOCYTESUR, NITRITE, PROTEINUA, GLUCOSEU, KETONESU, BILIRUBINUR, BLOODU, Amylase: No results found for: AMYLASE, Lipase: No results found for: LIPASE

## 2023-02-02 VITALS
WEIGHT: 293 LBS | BODY MASS INDEX: 53.92 KG/M2 | HEART RATE: 92 BPM | OXYGEN SATURATION: 97 % | TEMPERATURE: 97.5 F | DIASTOLIC BLOOD PRESSURE: 76 MMHG | SYSTOLIC BLOOD PRESSURE: 106 MMHG | RESPIRATION RATE: 16 BRPM | HEIGHT: 62 IN

## 2023-02-02 LAB
ANION GAP SERPL CALCULATED.3IONS-SCNC: 6 MMOL/L (ref 4–13)
BUN SERPL-MCNC: 10 MG/DL (ref 5–25)
CALCIUM SERPL-MCNC: 9.1 MG/DL (ref 8.3–10.1)
CHLORIDE SERPL-SCNC: 108 MMOL/L (ref 96–108)
CO2 SERPL-SCNC: 27 MMOL/L (ref 21–32)
CREAT SERPL-MCNC: 0.51 MG/DL (ref 0.6–1.3)
ERYTHROCYTE [DISTWIDTH] IN BLOOD BY AUTOMATED COUNT: 16.1 % (ref 11.6–15.1)
GFR SERPL CREATININE-BSD FRML MDRD: 105 ML/MIN/1.73SQ M
GLUCOSE SERPL-MCNC: 93 MG/DL (ref 65–140)
HCT VFR BLD AUTO: 34.4 % (ref 34.8–46.1)
HGB BLD-MCNC: 10.1 G/DL (ref 11.5–15.4)
MAGNESIUM SERPL-MCNC: 2 MG/DL (ref 1.6–2.6)
MCH RBC QN AUTO: 24 PG (ref 26.8–34.3)
MCHC RBC AUTO-ENTMCNC: 29.4 G/DL (ref 31.4–37.4)
MCV RBC AUTO: 82 FL (ref 82–98)
PLATELET # BLD AUTO: 361 THOUSANDS/UL (ref 149–390)
PMV BLD AUTO: 9 FL (ref 8.9–12.7)
POTASSIUM SERPL-SCNC: 3.9 MMOL/L (ref 3.5–5.3)
RBC # BLD AUTO: 4.2 MILLION/UL (ref 3.81–5.12)
SODIUM SERPL-SCNC: 141 MMOL/L (ref 135–147)
WBC # BLD AUTO: 9.21 THOUSAND/UL (ref 4.31–10.16)

## 2023-02-02 RX ORDER — OXYCODONE HYDROCHLORIDE 5 MG/1
5 TABLET ORAL EVERY 6 HOURS PRN
Qty: 8 TABLET | Refills: 0 | Status: SHIPPED | OUTPATIENT
Start: 2023-02-02 | End: 2023-02-08 | Stop reason: SDUPTHER

## 2023-02-02 RX ORDER — METOPROLOL TARTRATE 50 MG/1
50 TABLET, FILM COATED ORAL EVERY 12 HOURS SCHEDULED
Qty: 60 TABLET | Refills: 0 | Status: SHIPPED | OUTPATIENT
Start: 2023-02-02 | End: 2023-03-04

## 2023-02-02 RX ORDER — METHOCARBAMOL 500 MG/1
500 TABLET, FILM COATED ORAL 4 TIMES DAILY
Qty: 40 TABLET | Refills: 0 | Status: SHIPPED | OUTPATIENT
Start: 2023-02-02

## 2023-02-02 RX ORDER — LANOLIN ALCOHOL/MO/W.PET/CERES
400 CREAM (GRAM) TOPICAL ONCE
Status: COMPLETED | OUTPATIENT
Start: 2023-02-02 | End: 2023-02-02

## 2023-02-02 RX ORDER — POTASSIUM CHLORIDE 20 MEQ/1
20 TABLET, EXTENDED RELEASE ORAL ONCE
Status: COMPLETED | OUTPATIENT
Start: 2023-02-02 | End: 2023-02-02

## 2023-02-02 RX ADMIN — METOPROLOL TARTRATE 50 MG: 50 TABLET, FILM COATED ORAL at 08:40

## 2023-02-02 RX ADMIN — LEVOTHYROXINE SODIUM 100 MCG: 100 TABLET ORAL at 08:40

## 2023-02-02 RX ADMIN — METHOCARBAMOL 500 MG: 500 TABLET ORAL at 14:07

## 2023-02-02 RX ADMIN — HEPARIN SODIUM 7500 UNITS: 5000 INJECTION INTRAVENOUS; SUBCUTANEOUS at 14:07

## 2023-02-02 RX ADMIN — OXYCODONE HYDROCHLORIDE 5 MG: 5 TABLET ORAL at 18:22

## 2023-02-02 RX ADMIN — OXYCODONE HYDROCHLORIDE 5 MG: 5 TABLET ORAL at 06:02

## 2023-02-02 RX ADMIN — MAGNESIUM OXIDE TAB 400 MG (241.3 MG ELEMENTAL MG) 400 MG: 400 (241.3 MG) TAB at 06:22

## 2023-02-02 RX ADMIN — ACETAMINOPHEN 975 MG: 325 TABLET ORAL at 05:37

## 2023-02-02 RX ADMIN — POTASSIUM CHLORIDE 20 MEQ: 1500 TABLET, EXTENDED RELEASE ORAL at 06:22

## 2023-02-02 RX ADMIN — METHOCARBAMOL 500 MG: 500 TABLET ORAL at 05:37

## 2023-02-02 RX ADMIN — HEPARIN SODIUM 7500 UNITS: 5000 INJECTION INTRAVENOUS; SUBCUTANEOUS at 05:36

## 2023-02-02 RX ADMIN — DOCUSATE SODIUM 100 MG: 100 CAPSULE, LIQUID FILLED ORAL at 08:40

## 2023-02-02 RX ADMIN — LIOTHYRONINE SODIUM 5 MCG: 5 TABLET ORAL at 08:41

## 2023-02-02 RX ADMIN — ACETAMINOPHEN 975 MG: 325 TABLET ORAL at 14:07

## 2023-02-02 NOTE — PLAN OF CARE
Problem: Prexisting or High Potential for Compromised Skin Integrity  Goal: Skin integrity is maintained or improved  Description: INTERVENTIONS:  - Identify patients at risk for skin breakdown  - Assess and monitor skin integrity  - Assess and monitor nutrition and hydration status  - Monitor labs   - Assess for incontinence   - Turn and reposition patient  - Assist with mobility/ambulation  - Relieve pressure over bony prominences  - Avoid friction and shearing  - Provide appropriate hygiene as needed including keeping skin clean and dry  - Evaluate need for skin moisturizer/barrier cream  - Collaborate with interdisciplinary team   - Patient/family teaching  - Consider wound care consult   Outcome: Progressing     Problem: PAIN - ADULT  Goal: Verbalizes/displays adequate comfort level or baseline comfort level  Description: Interventions:  - Encourage patient to monitor pain and request assistance  - Assess pain using appropriate pain scale  - Administer analgesics based on type and severity of pain and evaluate response  - Implement non-pharmacological measures as appropriate and evaluate response  - Consider cultural and social influences on pain and pain management  - Notify physician/advanced practitioner if interventions unsuccessful or patient reports new pain  Outcome: Progressing     Problem: INFECTION - ADULT  Goal: Absence or prevention of progression during hospitalization  Description: INTERVENTIONS:  - Assess and monitor for signs and symptoms of infection  - Monitor lab/diagnostic results  - Monitor all insertion sites, i e  indwelling lines, tubes, and drains  - Monitor endotracheal if appropriate and nasal secretions for changes in amount and color  - [place] appropriate cooling/warming therapies per order  - Administer medications as ordered  - Instruct and encourage patient and family to use good hand hygiene technique  - Identify and instruct in appropriate isolation precautions for identified infection/condition  Outcome: Progressing     Problem: DISCHARGE PLANNING  Goal: Discharge to home or other facility with appropriate resources  Description: INTERVENTIONS:  - Identify barriers to discharge w/patient and caregiver  - Arrange for needed discharge resources and transportation as appropriate  - Identify discharge learning needs (meds, wound care, etc )  - Arrange for interpretive services to assist at discharge as needed  - Refer to Case Management Department for coordinating discharge planning if the patient needs post-hospital services based on physician/advanced practitioner order or complex needs related to functional status, cognitive ability, or social support system  Outcome: Progressing     Problem: Knowledge Deficit  Goal: Patient/family/caregiver demonstrates understanding of disease process, treatment plan, medications, and discharge instructions  Description: Complete learning assessment and assess knowledge base    Interventions:  - Provide teaching at level of understanding  - Provide teaching via preferred learning methods  Outcome: Progressing     Problem: GASTROINTESTINAL - ADULT  Goal: Minimal or absence of nausea and/or vomiting  Description: INTERVENTIONS:  - Administer IV fluids if ordered to ensure adequate hydration  - Maintain NPO status until nausea and vomiting are resolved  - Nasogastric tube if ordered  - Administer ordered antiemetic medications as needed  - Provide nonpharmacologic comfort measures as appropriate  - Advance diet as tolerated, if ordered  - Consider nutrition services referral to assist patient with adequate nutrition and appropriate food choices  Outcome: Progressing  Goal: Maintains or returns to baseline bowel function  Description: INTERVENTIONS:  - Assess bowel function  - Encourage oral fluids to ensure adequate hydration  - Administer IV fluids if ordered to ensure adequate hydration  - Administer ordered medications as needed  - Encourage mobilization and activity  - Consider nutritional services referral to assist patient with adequate nutrition and appropriate food choices  Outcome: Progressing  Goal: Maintains adequate nutritional intake  Description: INTERVENTIONS:  - Monitor percentage of each meal consumed  - Identify factors contributing to decreased intake, treat as appropriate  - Assist with meals as needed  - Monitor I&O, weight, and lab values if indicated  - Obtain nutrition services referral as needed  Outcome: Progressing     Problem: METABOLIC, FLUID AND ELECTROLYTES - ADULT  Goal: Electrolytes maintained within normal limits  Description: INTERVENTIONS:  - Monitor labs and assess patient for signs and symptoms of electrolyte imbalances  - Administer electrolyte replacement as ordered  - Monitor response to electrolyte replacements, including repeat lab results as appropriate  - Instruct patient on fluid and nutrition as appropriate  Outcome: Progressing     Problem: SKIN/TISSUE INTEGRITY - ADULT  Goal: Incision(s), wounds(s) or drain site(s) healing without S/S of infection  Description: INTERVENTIONS  - Assess and document dressing, incision, wound bed, drain sites and surrounding tissue  - Provide patient and family education  - Perform skin care/dressing changes every  shift  Outcome: Progressing

## 2023-02-02 NOTE — PROGRESS NOTES
For questions/concerns on this patient, please reach out to the following:  SLB-OB GYN-GynOnc- Resident/AP  Gyn Oncology Progress note   Bridger Guadarrama 60 y o  female MRN: [id number]  [facility] Encounter: [id number]    Assessment/Plan:    60 y o   POD# 6 from ex-lap, HILTON, BSO, hernia repair with phasix mesh  Recovering well  Anticipate discharge today  * S/P HILTON-BSO (total abdominal hysterectomy and bilateral salpingo-oophorectomy)  Assessment & Plan  · FEN: regular diet, replete K and Mg today  · NEURO/PAIN: hung Tylenol/robaxin, prn oxy 5/10  · CV: home metoprolol ordered  · HEME: daily labs  · : koo catheter removed, voiding  · ENDO: home levythyroxine & liothyronine ordered  · PPx: heparin 7500 U TID, OOB, ICS   · Appreciate PT/OT recs  · Gen surg following, appreciate recs  · Lines/drains: R IJ (1/27- ), MARCO ANTONIO x2  · RIJ to be removed this morning           Subjective:    Bridger Guadarrama has no current complaints  Pain is well controlled with oral analgesics  Patient is currently voiding  She is ambulating  Patient is currently passing flatus and has had bowel movement  She is tolerating PO, and denies nausea or vomitting  Patient denies fever, chills, chest pain, shortness of breath, or calf tenderness  Objective:  /70   Pulse 92   Temp 97 7 °F (36 5 °C)   Resp 20   Ht 5' 2" (1 575 m)   Wt (!) 138 kg (305 lb)   SpO2 97%   BMI 55 79 kg/m²     I/O last 3 completed shifts: In: 0   Out: 170 [Drains:170]  I/O this shift: In: 960 [P O :960]  Out: 130 [Drains:130]    Lab Results   Component Value Date    WBC 9 21 02/02/2023    HGB 10 1 (L) 02/02/2023    HCT 34 4 (L) 02/02/2023    MCV 82 02/02/2023     02/02/2023       Lab Results   Component Value Date    CALCIUM 9 1 02/01/2023    K 3 8 02/01/2023    CO2 27 02/01/2023     02/01/2023    BUN 10 02/01/2023    CREATININE 0 56 (L) 02/01/2023           Physical Exam  Vitals reviewed     Constitutional:       General: She is not in acute distress  Appearance: She is obese  She is not toxic-appearing  HENT:      Head: Normocephalic and atraumatic  Eyes:      Extraocular Movements: Extraocular movements intact  Cardiovascular:      Rate and Rhythm: Normal rate and regular rhythm  Pulses: Normal pulses  Pulmonary:      Effort: Pulmonary effort is normal  No respiratory distress  Abdominal:      General: There is no distension  Palpations: Abdomen is soft  Tenderness: There is no guarding  Comments: Midline incision c/d/i  MARCO ANTONIO drains x2 serous   Musculoskeletal:         General: Normal range of motion  Cervical back: Normal range of motion  Skin:     General: Skin is warm and dry  Neurological:      General: No focal deficit present  Mental Status: She is alert  Mental status is at baseline     Psychiatric:         Mood and Affect: Mood normal          Behavior: Behavior normal            Haynes Babinski, MD  2/2/2023  6:36 AM

## 2023-02-02 NOTE — PLAN OF CARE
Problem: Prexisting or High Potential for Compromised Skin Integrity  Goal: Skin integrity is maintained or improved  Description: INTERVENTIONS:  - Identify patients at risk for skin breakdown  - Assess and monitor skin integrity  - Assess and monitor nutrition and hydration status  - Monitor labs   - Assess for incontinence   - Turn and reposition patient  - Assist with mobility/ambulation  - Relieve pressure over bony prominences  - Avoid friction and shearing  - Provide appropriate hygiene as needed including keeping skin clean and dry  - Evaluate need for skin moisturizer/barrier cream  - Collaborate with interdisciplinary team   - Patient/family teaching  Problem: PAIN - ADULT  Goal: Verbalizes/displays adequate comfort level or baseline comfort level  Description: Interventions:  - Encourage patient to monitor pain and request assistance  - Assess pain using appropriate pain scale  - Administer analgesics based on type and severity of pain and evaluate response  - Implement non-pharmacological measures as appropriate and evaluate response  - Consider cultural and social influences on pain and pain management  - Notify physician/advanced practitioner if interventions unsuccessful or patient reports new pain  Outcome: Progressing     Problem: INFECTION - ADULT  Goal: Absence or prevention of progression during hospitalization  Description: INTERVENTIONS:  - Assess and monitor for signs and symptoms of infection  - Monitor lab/diagnostic results  - Monitor all insertion sites, i e  indwelling lines, tubes, and drains  - Monitor endotracheal if appropriate and nasal secretions for changes in amount and color  - [place] appropriate cooling/warming therapies per order  - Administer medications as ordered  - Instruct and encourage patient and family to use good hand hygiene technique  - Identify and instruct in appropriate isolation precautions for identified infection/condition  Outcome: Progressing Problem: SKIN/TISSUE INTEGRITY - ADULT  Goal: Incision(s), wounds(s) or drain site(s) healing without S/S of infection  Description: INTERVENTIONS  - Assess and document dressing, incision, wound bed, drain sites and surrounding tissue  - Provide patient and family education  Outcome: Progressing

## 2023-02-02 NOTE — PROGRESS NOTES
Progress Note - General Surgery   Neville Post 60 y o  female MRN: [id number]  Unit/Bed#: [facility] 830-01 Encounter: [id number]      Assessment:  62yo F POD5 s/p ex-lap, HILTON/BSO, and ventral hernia repair w/ component separation      Afebrile, vitals WNL, and saturating adequately on room air  L MARCO ANTONIO: 70cc/24hrs serosanguineous  R MARCO ANTONIO: 60cc/24hrs serosanguineous    Plan:  - OK for discharge  - Continue MARCO ANTONIO drains and staples  - Follow-up general surgery in 2 weeks  - Activity as tolerated, OK to shower (no bathing, soaking, lotions/creams, etc)    Subjective/Objective     Subjective:   No acute events overnight  This AM the patient reported only minimal abdominal pain w/ no fevers/chills, nausea/emesis, or dyspnea  OOB/ambulating  Passing flatus and non-bloody bowel movements  Objective:     Blood pressure 145/70, pulse 92, temperature 97 7 °F (36 5 °C), resp  rate 20, height 5' 2" (1 575 m), weight (!) 138 kg (305 lb), SpO2 97 %  ,Body mass index is 55 79 kg/m²  Gen: Awake, alert, and in no acute distress  Head: Normocephalic, atraumatic  Neck: No obvious JVD, trachea midline  Cardiovascular: Regular rate  Respiratory:  In no acute respiratory distress w/ no use of accessory muscles of respiration  Abd: Soft, non-distended, and appropriately tender to palpation overlying laparotomy healing well w/ staples in place; no guarding/rigidity or signs of peritonitis, B/L JPs w/ serosanguineous fluid in bulb collection  Extremities: No visible wounds/ulcerations to the B/L upper/lower extremities  Skin: Warm/dry  Psychiatric: Appropriate mood/affect    Intake/Output Summary (Last 24 hours) at 2/2/2023 7698  Last data filed at 2/2/2023 0115  Gross per 24 hour   Intake 960 ml   Output 130 ml   Net 830 ml       Invasive Devices     Peripherally Inserted Central Catheter Line  Duration           PICC Line 01/31/23 1 day          Central Venous Catheter Line  Duration           CVC Central Lines Triple Right Internal jugular -- days    CVC Central Lines 01/27/23 Triple 5 days          Drain  Duration           Closed/Suction Drain Left Abdomen Bulb 10 Fr  5 days    Closed/Suction Drain Right Abdomen Bulb 10 Fr  5 days                I have personally reviewed pertinent lab results    , CBC:   Lab Results   Component Value Date    WBC 9 21 02/02/2023    HGB 10 1 (L) 02/02/2023    HCT 34 4 (L) 02/02/2023    MCV 82 02/02/2023     02/02/2023    MCH 24 0 (L) 02/02/2023    MCHC 29 4 (L) 02/02/2023    RDW 16 1 (H) 02/02/2023    MPV 9 0 02/02/2023   , CMP: No results found for: SODIUM, K, CL, CO2, ANIONGAP, BUN, CREATININE, GLUCOSE, CALCIUM, AST, ALT, ALKPHOS, PROT, BILITOT, EGFR, Coagulation: No results found for: PT, INR, APTT, Urinalysis: No results found for: COLORU, CLARITYU, SPECGRAV, PHUR, LEUKOCYTESUR, NITRITE, PROTEINUA, GLUCOSEU, KETONESU, BILIRUBINUR, BLOODU, Amylase: No results found for: AMYLASE, Lipase: No results found for: LIPASE

## 2023-02-06 ENCOUNTER — TELEPHONE (OUTPATIENT)
Dept: GYNECOLOGIC ONCOLOGY | Facility: CLINIC | Age: 58
End: 2023-02-06

## 2023-02-06 NOTE — TELEPHONE ENCOUNTER
Patient called into the office stating that she has some post op questions after being discharged after her surgery with Dr Alean Osgood last week   Patient is requesting a phone call back @ [phone]

## 2023-02-08 ENCOUNTER — TELEPHONE (OUTPATIENT)
Dept: GYNECOLOGIC ONCOLOGY | Facility: CLINIC | Age: 58
End: 2023-02-08

## 2023-02-08 DIAGNOSIS — Z90.710 S/P ABDOMINAL HYSTERECTOMY: ICD-10-CM

## 2023-02-08 DIAGNOSIS — Z90.722 S/P TAH-BSO (TOTAL ABDOMINAL HYSTERECTOMY AND BILATERAL SALPINGO-OOPHORECTOMY): ICD-10-CM

## 2023-02-08 DIAGNOSIS — Z90.710 S/P TAH-BSO (TOTAL ABDOMINAL HYSTERECTOMY AND BILATERAL SALPINGO-OOPHORECTOMY): ICD-10-CM

## 2023-02-08 DIAGNOSIS — Z90.79 S/P TAH-BSO (TOTAL ABDOMINAL HYSTERECTOMY AND BILATERAL SALPINGO-OOPHORECTOMY): ICD-10-CM

## 2023-02-08 RX ORDER — OXYCODONE HYDROCHLORIDE 5 MG/1
5 TABLET ORAL EVERY 6 HOURS PRN
Qty: 10 TABLET | Refills: 0 | Status: SHIPPED | OUTPATIENT
Start: 2023-02-08 | End: 2023-02-18

## 2023-02-08 NOTE — TELEPHONE ENCOUNTER
Patient called into the office to request a refill on the following medication       oxyCODONE (Roxicodone) 5 immediate release tablet [[id number]      [address] [facility] [place] [address]   Phone:  [phone]  Fax:  [phone]   MALCOLM #:  --

## 2023-02-14 ENCOUNTER — TELEPHONE (OUTPATIENT)
Dept: SURGICAL ONCOLOGY | Facility: CLINIC | Age: 58
End: 2023-02-14

## 2023-02-14 ENCOUNTER — OFFICE VISIT (OUTPATIENT)
Dept: GYNECOLOGIC ONCOLOGY | Facility: CLINIC | Age: 58
End: 2023-02-14

## 2023-02-14 ENCOUNTER — TELEPHONE (OUTPATIENT)
Dept: HEMATOLOGY ONCOLOGY | Facility: CLINIC | Age: 58
End: 2023-02-14

## 2023-02-14 ENCOUNTER — OFFICE VISIT (OUTPATIENT)
Dept: SURGERY | Facility: CLINIC | Age: 58
End: 2023-02-14

## 2023-02-14 VITALS — DIASTOLIC BLOOD PRESSURE: 78 MMHG | SYSTOLIC BLOOD PRESSURE: 152 MMHG | HEART RATE: 100 BPM | OXYGEN SATURATION: 99 %

## 2023-02-14 VITALS — TEMPERATURE: 97.3 F | BODY MASS INDEX: 55.79 KG/M2 | HEIGHT: 62 IN

## 2023-02-14 DIAGNOSIS — Z90.79 S/P TAH-BSO (TOTAL ABDOMINAL HYSTERECTOMY AND BILATERAL SALPINGO-OOPHORECTOMY): Primary | ICD-10-CM

## 2023-02-14 DIAGNOSIS — K43.9 VENTRAL HERNIA WITHOUT OBSTRUCTION OR GANGRENE: Primary | ICD-10-CM

## 2023-02-14 DIAGNOSIS — Z90.710 S/P TAH-BSO (TOTAL ABDOMINAL HYSTERECTOMY AND BILATERAL SALPINGO-OOPHORECTOMY): Primary | ICD-10-CM

## 2023-02-14 DIAGNOSIS — Z90.722 S/P TAH-BSO (TOTAL ABDOMINAL HYSTERECTOMY AND BILATERAL SALPINGO-OOPHORECTOMY): Primary | ICD-10-CM

## 2023-02-14 NOTE — TELEPHONE ENCOUNTER
Pat from General Surgery called with a request of Dr Dooley for Dr Tiwari to see patient next Wednesday 2/22/23  Patient has appointment at General Surgery at 1:00 on 2/22/23  Patient lives over 2 hours away in [address] [place] [address] so same day appointments would be better  She reports Dr Tiwari and Dr Dooley spoke today regarding patient   Please call Christopher Dye back with an appointment for the patient, [phone]

## 2023-02-14 NOTE — PROGRESS NOTES
Assessment/Plan:    Problem List Items Addressed This Visit        Other    S/P HILTON-BSO (total abdominal hysterectomy and bilateral salpingo-oophorectomy) - Primary     50-year-old status post total abdominal hysterectomy, bilateral salpingo-oophorectomy, extensive lysis of adhesions, hernia repair with mesh, component separation on 1/27/2023  Final pathology was benign  She has bilateral anterior abdominal wall drains, evidence of inflammation of the inferior aspect of her incision  Staples are in situ  Her performance status is 2   1   We discussed ongoing activity limitations  2   She has follow-up with Dr Jovanna Armstrong for wound evaluation and drain evaluation  3   Return in 4 weeks for postoperative pelvic examination  CHIEF COMPLAINT: Postoperative evaluation           Patient ID: Bridger Guadarrama is a 60 y o  female  Who returns for postoperative evaluation  She has been ambulating around her house  She is able to do some of her normal tasks, however, she is limited by abdominal discomfort and a pulling sensation from the mesh  She has bilateral subcutaneous drains in that have been putting out approximately 30 to 50 cc/day  She has been afebrile  No vaginal bleeding  She requires the occasional oxycodone tablet but for the most part her pain is managed without narcotics  She is voiding and having bowel movements        The following portions of the patient's history were reviewed and updated as appropriate: allergies, current medications, past family history, past medical history, past social history, past surgical history and problem list     Review of Systems      Current Outpatient Medications:   •  acetaminophen (TYLENOL) 500 mg tablet, Take 2 tablets (1,000 mg total) by mouth every 6 (six) hours as needed for mild pain, Disp: 30 tablet, Rfl: 0  •  apixaban (Eliquis) 2 5 mg, Take 1 tablet PO BID x 28 days post-operatively, Disp: 56 tablet, Rfl: 0  •  aspirin (ECOTRIN LOW STRENGTH) 81 mg EC tablet, Take 81 mg by mouth daily, Disp: , Rfl:   •  Cholecalciferol 125 MCG (5000 UT) capsule, Take by mouth, Disp: , Rfl:   •  co-enzyme Q-10 30 MG capsule, Take 30 mg by mouth 3 (three) times a day, Disp: , Rfl:   •  levothyroxine 100 mcg tablet, Take 100 mcg by mouth daily, Disp: , Rfl:   •  liothyronine (CYTOMEL) 5 mcg tablet, Take 5 mcg by mouth daily, Disp: , Rfl:   •  methocarbamol (ROBAXIN) 500 mg tablet, Take 1 tablet (500 mg total) by mouth 4 (four) times a day, Disp: 40 tablet, Rfl: 0  •  metoprolol tartrate (LOPRESSOR) 50 mg tablet, Take 1 tablet (50 mg total) by mouth every 12 (twelve) hours, Disp: 60 tablet, Rfl: 0  •  oxyCODONE (Roxicodone) 5 immediate release tablet, Take 1 tablet (5 mg total) by mouth every 6 (six) hours as needed for severe pain for up to 10 days Max Daily Amount: 20 mg, Disp: 10 tablet, Rfl: 0  •  vitamin B-12 (VITAMIN B-12) 1,000 mcg tablet, Take by mouth, Disp: , Rfl:     Objective:    Blood pressure 152/78, pulse 100, SpO2 99 %  There is no height or weight on file to calculate BMI  There is no height or weight on file to calculate BSA  Physical Exam  Vitals reviewed  Constitutional:       General: She is not in acute distress  Appearance: Normal appearance  HENT:      Head: Normocephalic and atraumatic  Mouth/Throat:      Mouth: Mucous membranes are moist    Pulmonary:      Effort: Pulmonary effort is normal    Abdominal:      Palpations: Abdomen is soft  There is no mass  Tenderness: There is no abdominal tenderness  Skin:     General: Skin is warm and dry  Comments: Midline incision demonstrates erythema at the inferior aspect with a fibrinous exudate, small amount of serous drainage  The superior aspect of the incision is clean dry and intact  Neurological:      Mental Status: She is alert and oriented to person, place, and time     Psychiatric:         Mood and Affect: Mood normal          Behavior: Behavior normal          Thought Content:  Thought content normal          Judgment: Judgment normal

## 2023-02-14 NOTE — ASSESSMENT & PLAN NOTE
65-year-old status post total abdominal hysterectomy, bilateral salpingo-oophorectomy, extensive lysis of adhesions, hernia repair with mesh, component separation on 1/27/2023  Final pathology was benign  She has bilateral anterior abdominal wall drains, evidence of inflammation of the inferior aspect of her incision  Staples are in situ  Her performance status is 2   1   We discussed ongoing activity limitations  2   She has follow-up with Dr Coby Penn for wound evaluation and drain evaluation  3   Return in 4 weeks for postoperative pelvic examination

## 2023-02-14 NOTE — PROGRESS NOTES
Office Visit - General Surgery  Torey Zuluaga MRN: [id number]  Encounter: [id number]    Assessment and Plan    SP ventral herniorraphy -- Right drain removed; staples out  Will need VNA to assist with dressing changes to drain site and lower wound  Change BID or more frequently as needed    Problem List Items Addressed This Visit    None      Chief Complaint:  Torey Zuluaga is a 60 y o  female who presents for Post-op (P/o incisional hernia )    Subjective      Past Medical History:   Diagnosis Date   • Acute hypoxemic respiratory failure due to COVID-19 [facility])     trached-trachael damage-resection  damage to liver feeding tube   renal failure-dialysis   • History of renal failure     s/p sepsis and severe covid, was on dialysis   • History of transfusion     Pt unsure but thinks they gave her blood   • Primary hypertension        Past Surgical History:   Procedure Laterality Date   • CARPAL TUNNEL RELEASE     • EXPLORATORY LAPAROTOMY     • HYSTERECTOMY N/A 1/27/2023    Procedure: HYSTERECTOMY TOTAL ABDOMINAL (HILTON), BILATERAL SALPINGO-OOPHORECTOMY, WITH EXTENSIVE LYSIS OF ADHESIONS;  Surgeon: Dorothy Bernheim, MD;  Location: [facility];  Service: Gynecology Oncology   • [address] N/A 1/27/2023    Procedure: Annabel Sebastien WITH PHASIX MESH;  Surgeon: Pio Barksdale MD;  Location: [facility];  Service: General   • PEG TUBE PLACEMENT      and removed   • UT HYSTEROSCOPY BX ENDOMETRIUM&/POLYPC W/WO D&C N/A 10/17/2022    Procedure: DILATATION AND CURETTAGE (D&C) WITH HYSTEROSCOPY;  Surgeon: Dorothy Bernheim, MD;  Location: [facility];  Service: Gynecology Oncology   • UT MUSC MYOCUTANEOUS/FASCIOCUTANEOUS FLAP TRUNK N/A 1/27/2023    Procedure: COMPONENT SEPARATION;  Surgeon: Pio Barksdale MD;  Location: [facility];  Service: General   • REVISION TRACHEOSTOMY SCAR     • TRACHEOSTOMY     • TUMOR REMOVAL Left     upper arm at age 11, benign       Family History   Problem Relation Age of Onset   • Cancer Mother    • Cancer Father        Social History     Tobacco Use   • Smoking status: Former     Packs/day: 0 50     Years: 10 00     Pack years: 5 00     Types: Cigarettes     Quit date: 80     Years since quittin 1   • Smokeless tobacco: Never   Vaping Use   • Vaping Use: Never used   Substance Use Topics   • Alcohol use: Yes     Comment: 1 x month   • Drug use: Never        Medications  Current Outpatient Medications on File Prior to Visit   Medication Sig Dispense Refill   • acetaminophen (TYLENOL) 500 mg tablet Take 2 tablets (1,000 mg total) by mouth every 6 (six) hours as needed for mild pain 30 tablet 0   • apixaban (Eliquis) 2 5 mg Take 1 tablet PO BID x 28 days post-operatively 56 tablet 0   • aspirin (ECOTRIN LOW STRENGTH) 81 mg EC tablet Take 81 mg by mouth daily     • Cholecalciferol 125 MCG (5000 UT) capsule Take by mouth     • co-enzyme Q-10 30 MG capsule Take 30 mg by mouth 3 (three) times a day     • levothyroxine 100 mcg tablet Take 100 mcg by mouth daily     • liothyronine (CYTOMEL) 5 mcg tablet Take 5 mcg by mouth daily     • methocarbamol (ROBAXIN) 500 mg tablet Take 1 tablet (500 mg total) by mouth 4 (four) times a day 40 tablet 0   • metoprolol tartrate (LOPRESSOR) 50 mg tablet Take 1 tablet (50 mg total) by mouth every 12 (twelve) hours 60 tablet 0   • oxyCODONE (Roxicodone) 5 immediate release tablet Take 1 tablet (5 mg total) by mouth every 6 (six) hours as needed for severe pain for up to 10 days Max Daily Amount: 20 mg 10 tablet 0   • vitamin B-12 (VITAMIN B-12) 1,000 mcg tablet Take by mouth     • [DISCONTINUED] polyethylene glycol (MiraLax) 17 GM/SCOOP powder Mix with 64 oz Gatorade, begin 4 PM day before surgery per bowel prep instructions  (Patient not taking: Reported on 2023) 238 g 0     No current facility-administered medications on file prior to visit         Allergies  No Known Allergies    Review of Systems    Objective  Vitals: 02/14/23 1152   Temp: (!) 97 3 °F (36 3 °C)       Physical Exam     Abdomen -- midline incision -- minimal erythema at base of wound; serous drainage from drain site and midline wound

## 2023-02-15 DIAGNOSIS — T81.30XA ABDOMINAL WOUND DEHISCENCE, INITIAL ENCOUNTER: Primary | ICD-10-CM

## 2023-02-17 ENCOUNTER — TELEPHONE (OUTPATIENT)
Dept: GYNECOLOGIC ONCOLOGY | Facility: CLINIC | Age: 58
End: 2023-02-17

## 2023-02-17 ENCOUNTER — TELEPHONE (OUTPATIENT)
Dept: SURGERY | Facility: CLINIC | Age: 58
End: 2023-02-17

## 2023-02-17 DIAGNOSIS — T81.30XA WOUND DEHISCENCE: Primary | ICD-10-CM

## 2023-02-17 RX ORDER — CEPHALEXIN 500 MG/1
500 CAPSULE ORAL EVERY 6 HOURS SCHEDULED
Qty: 20 CAPSULE | Refills: 0 | Status: SHIPPED | OUTPATIENT
Start: 2023-02-17 | End: 2023-02-22

## 2023-02-17 NOTE — TELEPHONE ENCOUNTER
Pt is s/p ventral herniorraphy with lower wound  Pt saw Dr Patricio Bejarano in [place] on [address] [facility] , 2/14/23  VNA called and would like to speak to physician regarding the wound - it has opened at the bottom      Passing this to Dr Loc Lopez for call back:  JOHN Cadet [phone] mb

## 2023-02-17 NOTE — TELEPHONE ENCOUNTER
Patient and her visiting nurse, Heather Anderson, called with concern re: superficial wound dehiscence of lower aspect of midline incision, measuring 3cm x 2cm x 1cm deep  Photos uploaded to media tab  Minimal redness, no overt drainage or induration, and wound beds appear to be granulating well  There is no tenderness  Patient and nurse advised to cover wound with collagen silver and DSD  Patient is concerned about infection going into the weekend  She has a history of sepsis and is diabetic   I explained that I would send abx to her pharmacy to start over the weekend if appearance of wound worsened  VNA is coming back to her home on Monday for reassessment

## 2023-02-17 NOTE — TELEPHONE ENCOUNTER
Patient and nurse called back in, still no call from the doctor  After hours on call for Gen Surg Shaan  Was paged  Wound has opened, there is puss and redness

## 2023-02-18 ENCOUNTER — NURSE TRIAGE (OUTPATIENT)
Dept: OTHER | Facility: OTHER | Age: 58
End: 2023-02-18

## 2023-02-18 NOTE — TELEPHONE ENCOUNTER
Advised to follow previous wound guidance and avoid getting areas in question wet until nursing visit Monday  Patient verbalized understanding  Reason for Disposition  • Nursing judgment or information in reference    Answer Assessment - Initial Assessment Questions  1   SYMPTOM: "What's the main symptom you're concerned about?" (e g , redness, pain, drainage)      Area had opened up, was given guidance yesterday, but wanted to shower today    Protocols used: NO GUIDELINE AVAILABLE-ADULT-, POST-OP INCISION SYMPTOMS AND QUESTIONS-ADULT-

## 2023-02-18 NOTE — TELEPHONE ENCOUNTER
Paged Dr. Malik's office about increased agitation. See MAR   Regarding: wound care questions  ----- Message from Dmitriy Bertrand sent at 2/18/2023 10:31 AM EST -----  " Some of my wounds have opened up and I need to know what I can and can not do  "

## 2023-02-19 ENCOUNTER — NURSE TRIAGE (OUTPATIENT)
Dept: OTHER | Facility: OTHER | Age: 58
End: 2023-02-19

## 2023-02-19 NOTE — TELEPHONE ENCOUNTER
Patient called in stating she had no drainage in tube all week   States within the last 15 min she had to empty drain 3 times  Since on the phone drain has slowed down  States pink drainage  Denies fever, pain, bleeding of other symptoms  Advised to monitor drain, fever, bleeding and other symptoms  States home care nurse coming to [facility] as well  Reason for Disposition  • [1] Clear or blood-tinged fluid draining from wound AND [2] no fever    Answer Assessment - Initial Assessment Questions  1  SYMPTOM: "What's the main symptom you're concerned about?" (e g , redness, pain, drainage)      increased drainage changed drain 3 times in last 15 min  Reached 75ml   2  ONSET: "When did symptoms start?"      Today   3  SURGERY: "What surgery was performed?"      *No Answer*  4  DATE of SURGERY: "When was surgery performed?"       *No Answer*  5  INCISION SITE: "Where is the incision located?"       *No Answer*  6  REDNESS: "Is there any redness at the incision site?" If yes, ask: "How wide across is the redness?" (Inches, centimeters)   Denies   7  PAIN: "Is there any pain?" If Yes, ask: "How bad is it?"  (Scale 1-10; or mild, moderate, severe)    denies  8  BLEEDING: "Is there any bleeding?" If Yes, ask: "How much?" and "Where?"    denies  9  DRAINAGE: "Is there any drainage from the incision site?" If yes, ask: "What color and how much?" (e g , red, cloudy, pus; drops, teaspoon)      Yes drainage with pink   10  FEVER: "Do you have a fever?" If Yes, ask: "What is your temperature, how was it measured, and when did it start?"        Denies   11   OTHER SYMPTOMS: "Do you have any other symptoms?" (e g , shaking chills, weakness, rash elsewhere on body)        *No Answer*    Protocols used: POST-OP INCISION SYMPTOMS AND QUESTIONS-ADULT-AH

## 2023-02-19 NOTE — TELEPHONE ENCOUNTER
Regarding: drain issues  ----- Message from Nhung Mckinney sent at 2/19/2023  9:24 AM EST -----  "I had surgery and I only have one drain  There has been no output in the drain since last Tuesday  This morning it was half filled so I emptied it and then I looked a second later and it was filled back up   What should I do?"

## 2023-02-20 ENCOUNTER — TELEPHONE (OUTPATIENT)
Dept: GYNECOLOGIC ONCOLOGY | Facility: CLINIC | Age: 58
End: 2023-02-20

## 2023-02-20 NOTE — TELEPHONE ENCOUNTER
Called patient regarding her appointment with Dr Andrez Garvin on 3/17/23  Offered patient an earlier appointment that day due to Dr Andrez Garvin being in surgery in the afternoon  Patient unable to come in at an early time due to distance she has to travel to get here   We rescheduled for 3/29/23

## 2023-02-22 ENCOUNTER — OFFICE VISIT (OUTPATIENT)
Dept: SURGERY | Facility: CLINIC | Age: 58
End: 2023-02-22

## 2023-02-22 DIAGNOSIS — K43.2 INCISIONAL HERNIA WITHOUT OBSTRUCTION OR GANGRENE: Primary | ICD-10-CM

## 2023-02-27 ENCOUNTER — OFFICE VISIT (OUTPATIENT)
Dept: SURGERY | Facility: CLINIC | Age: 58
End: 2023-02-27

## 2023-02-27 DIAGNOSIS — S31.109D OPEN WOUND OF ABDOMINAL WALL, SUBSEQUENT ENCOUNTER: Primary | ICD-10-CM

## 2023-02-27 PROBLEM — S31.109A OPEN ABDOMINAL WALL WOUND: Status: ACTIVE | Noted: 2023-02-27

## 2023-02-27 NOTE — ASSESSMENT & PLAN NOTE
- Healing postoperative open abdominal wall wound along the midline incision    - The wound appears to be healing appropriately with no surrounding skin changes, foul odor, purulent drainage or evidence of necrotic/nonviable tissue requiring debridement at the time of today's office visit  - Continue local wound care with moist to dry gauze dressing changes twice daily and no more frequently as needed for soiled  - Patient cautioned to maintain certain activity restrictions including avoiding strenuous exercise or activity, heavy lifting, pushing or pulling activities, but may slowly increase light activities including going for walks and light exercise  Patient also cleared to resume driving as she feels comfortable  - May continue to shower daily  - VNA to continue with assistance with wound care and wound monitoring   - Return to the surgical clinic in 2 to 3 weeks for wound reevaluation

## 2023-02-27 NOTE — PROGRESS NOTES
Office Visit - General Surgery  Danna Crowe MRN: [id number]  Encounter: [id number]    Assessment and Plan    Problem List Items Addressed This Visit        Other    Open abdominal wall wound - Primary     - Healing postoperative open abdominal wall wound along the midline incision    - The wound appears to be healing appropriately with no surrounding skin changes, foul odor, purulent drainage or evidence of necrotic/nonviable tissue requiring debridement at the time of today's office visit  - Continue local wound care with moist to dry gauze dressing changes twice daily and no more frequently as needed for soiled  - Patient cautioned to maintain certain activity restrictions including avoiding strenuous exercise or activity, heavy lifting, pushing or pulling activities, but may slowly increase light activities including going for walks and light exercise  Patient also cleared to resume driving as she feels comfortable  - May continue to shower daily  - VNA to continue with assistance with wound care and wound monitoring   - Return to the surgical clinic in 2 to 3 weeks for wound reevaluation  Chief Complaint:  Danna Crowe is a 60 y o  female who presents for reevaluation of open abdominal wound  Subjective  The patient expressed some frustration regarding the unanticipated availability issue with the surgical attending, but was understanding considering the circumstances  She notes that her abdominal wound continues to be open and she is noted some increased bloody drainage during dressing changes since transitioning to wet-to-dry dressing changes over the last week  She notes no skin changes, no foul odor purulent drainage, no fevers or chills  She is tolerating her diet without nausea or vomiting and is having bowel function  She does state that her breathing has become easier since the surgery    She does have on and off soreness, and notes that today she is having more soreness than usual in her upper abdomen underneath her ribs  She has no other new complaints at this time  Past Medical History  Past Medical History:   Diagnosis Date   • Acute hypoxemic respiratory failure due to COVID-19 [facility])     trached-trachael damage-resection  damage to liver feeding tube   renal failure-dialysis   • History of renal failure     s/p sepsis and severe covid, was on dialysis   • History of transfusion     Pt unsure but thinks they gave her blood   • Primary hypertension        Past Surgical History  Past Surgical History:   Procedure Laterality Date   • CARPAL TUNNEL RELEASE     • EXPLORATORY LAPAROTOMY     • HYSTERECTOMY N/A 1/27/2023    Procedure: HYSTERECTOMY TOTAL ABDOMINAL (HILTON), BILATERAL SALPINGO-OOPHORECTOMY, WITH EXTENSIVE LYSIS OF ADHESIONS;  Surgeon: Delores Vázquez MD;  [facility];  Service: Gynecology Oncology   • [address] N/A 1/27/2023    Procedure: Pilo Kareen WITH PHASIX MESH;  Surgeon: Vielka Kaminski MD;  [facility];  Service: General   • PEG TUBE PLACEMENT      and removed   • TX HYSTEROSCOPY BX ENDOMETRIUM&/POLYPC W/WO D&C N/A 10/17/2022    Procedure: DILATATION AND CURETTAGE (D&C) WITH HYSTEROSCOPY;  Surgeon: Delores Vázquez MD;  [facility];  Service: Gynecology Oncology   • [place] MUSC MYOCUTANEOUS/FASCIOCUTANEOUS FLAP TRUNK N/A 1/27/2023    Procedure: COMPONENT SEPARATION;  Surgeon: Vielka Kaminski MD;  [facility];  Service: General   • REVISION TRACHEOSTOMY SCAR     • TRACHEOSTOMY     • TUMOR REMOVAL Left     upper arm at age 11, benign       Family History  Family History   Problem Relation Age of Onset   • Cancer Mother    • Cancer Father        Medications  Current Outpatient Medications on File Prior to Visit   Medication Sig Dispense Refill   • acetaminophen (TYLENOL) 500 mg tablet Take 2 tablets (1,000 mg total) by mouth every 6 (six) hours as needed for mild pain 30 tablet 0   • apixaban (Eliquis) 2 5 mg Take 1 tablet PO BID x 28 days post-operatively 56 tablet 0   • aspirin (ECOTRIN LOW STRENGTH) 81 mg EC tablet Take 81 mg by mouth daily     • Cholecalciferol 125 MCG (5000 UT) capsule Take by mouth     • co-enzyme Q-10 30 MG capsule Take 30 mg by mouth 3 (three) times a day     • levothyroxine 100 mcg tablet Take 100 mcg by mouth daily     • liothyronine (CYTOMEL) 5 mcg tablet Take 5 mcg by mouth daily     • methocarbamol (ROBAXIN) 500 mg tablet Take 1 tablet (500 mg total) by mouth 4 (four) times a day 40 tablet 0   • metoprolol tartrate (LOPRESSOR) 50 mg tablet Take 1 tablet (50 mg total) by mouth every 12 (twelve) hours 60 tablet 0   • vitamin B-12 (VITAMIN B-12) 1,000 mcg tablet Take by mouth       No current facility-administered medications on file prior to visit  Allergies  No Known Allergies    Review of Systems   Constitutional: Negative for activity change, appetite change, chills, fatigue and fever  Respiratory: Negative for cough, chest tightness, shortness of breath and wheezing  Cardiovascular: Negative for chest pain and leg swelling  Gastrointestinal: Positive for abdominal pain  Negative for abdominal distention, constipation, diarrhea, nausea and vomiting  Musculoskeletal: Negative for arthralgias  Skin: Positive for wound (Persistent open midline abdominal wound as well as healing wounds from prior drain sites)  Negative for color change, pallor and rash  Psychiatric/Behavioral: Negative for agitation, confusion and self-injury  Objective  There were no vitals filed for this visit  Physical Exam  Vitals and nursing note reviewed  Exam conducted with a chaperone present  Constitutional:       General: She is not in acute distress  Appearance: Normal appearance  She is obese  She is not ill-appearing, toxic-appearing or diaphoretic  HENT:      Head: Normocephalic and atraumatic        Right Ear: External ear normal       Left Ear: External ear normal       Nose: Nose normal       Mouth/Throat:      Mouth: Mucous membranes are moist       Pharynx: Oropharynx is clear  Eyes:      Extraocular Movements: Extraocular movements intact  Conjunctiva/sclera: Conjunctivae normal    Cardiovascular:      Rate and Rhythm: Normal rate and regular rhythm  Pulses: Normal pulses  Heart sounds: Normal heart sounds  No murmur heard  No friction rub  No gallop  Pulmonary:      Effort: Pulmonary effort is normal  No respiratory distress  Breath sounds: Normal breath sounds  No stridor  No wheezing, rhonchi or rales  Abdominal:      General: There is no distension  Palpations: Abdomen is soft  Tenderness: There is abdominal tenderness (Minimal upper abdominal tenderness and tenderness around her open midline abdominal wound)  There is no guarding or rebound  Musculoskeletal:      Right lower leg: No edema  Left lower leg: No edema  Skin:     General: Skin is warm and dry  Capillary Refill: Capillary refill takes less than 2 seconds  Coloration: Skin is not jaundiced or pale  Findings: Wound (Open abdominal wound and healing drain site wounds on the abdominal wall as noted) present  No bruising, erythema, lesion or rash  Neurological:      General: No focal deficit present  Mental Status: She is alert and oriented to person, place, and time  Mental status is at baseline  Sensory: No sensory deficit  Motor: No weakness     Psychiatric:         Mood and Affect: Mood normal

## 2023-03-29 ENCOUNTER — OFFICE VISIT (OUTPATIENT)
Dept: SURGERY | Facility: CLINIC | Age: 58
End: 2023-03-29

## 2023-03-29 ENCOUNTER — OFFICE VISIT (OUTPATIENT)
Dept: GYNECOLOGIC ONCOLOGY | Facility: CLINIC | Age: 58
End: 2023-03-29

## 2023-03-29 VITALS
HEIGHT: 62 IN | DIASTOLIC BLOOD PRESSURE: 80 MMHG | BODY MASS INDEX: 55.79 KG/M2 | TEMPERATURE: 98.1 F | OXYGEN SATURATION: 98 % | HEART RATE: 74 BPM | SYSTOLIC BLOOD PRESSURE: 148 MMHG

## 2023-03-29 VITALS — TEMPERATURE: 96.4 F

## 2023-03-29 DIAGNOSIS — Z90.722 S/P TAH-BSO (TOTAL ABDOMINAL HYSTERECTOMY AND BILATERAL SALPINGO-OOPHORECTOMY): ICD-10-CM

## 2023-03-29 DIAGNOSIS — Z98.890 STATUS POST REPAIR OF VENTRAL HERNIA: Primary | ICD-10-CM

## 2023-03-29 DIAGNOSIS — S31.109D OPEN WOUND OF ABDOMINAL WALL, SUBSEQUENT ENCOUNTER: ICD-10-CM

## 2023-03-29 DIAGNOSIS — Z87.19 STATUS POST REPAIR OF VENTRAL HERNIA: Primary | ICD-10-CM

## 2023-03-29 DIAGNOSIS — Z90.79 S/P TAH-BSO (TOTAL ABDOMINAL HYSTERECTOMY AND BILATERAL SALPINGO-OOPHORECTOMY): ICD-10-CM

## 2023-03-29 DIAGNOSIS — Z90.710 S/P TAH-BSO (TOTAL ABDOMINAL HYSTERECTOMY AND BILATERAL SALPINGO-OOPHORECTOMY): ICD-10-CM

## 2023-03-29 DIAGNOSIS — Z09 POSTOP CHECK: Primary | ICD-10-CM

## 2023-03-29 PROBLEM — K43.9 VENTRAL HERNIA WITHOUT OBSTRUCTION OR GANGRENE: Status: RESOLVED | Noted: 2022-09-13 | Resolved: 2023-03-29

## 2023-03-29 RX ORDER — IBUPROFEN 200 MG
400 TABLET ORAL
COMMUNITY
Start: 2023-02-17

## 2023-03-29 NOTE — PROGRESS NOTES
Assessment/Plan:    Problem List Items Addressed This Visit        Other    S/P HILTON-BSO (total abdominal hysterectomy and bilateral salpingo-oophorectomy)    Open abdominal wall wound     Covered with dressing  Minimal tenderness  Postop check - Primary     14-year-old who is status post HILTON, BSO, extensive lysis of adhesions, and hernia repair with mesh on January 27, 2023  Final pathology was benign  Her visiting nurse called on 2/17/23 to report a dehiscence of her midline incision below the umbilicus  She has been under the care of Dr Nick Smith for this and has VNA managing her wound at home  She saw Dr Nick Smith earlier today who noted that the wound is almost completely healed, with just a small defect  It is covered with a Band Aid  Otherwise, she has no new concerns and has recovered well from surgery  Her PS is 1  Patient will resume routine gyn care with her primary gynecologist  She will call us back with bleeding or other new concerns  CHIEF COMPLAINT: Postoperative exam       Problem:  Cancer Staging   No matching staging information was found for the patient  Previous therapy:  Oncology History    No history exists  Patient ID: Artis Leyva is a 60 y o  female  This is a 60 y o  female who presents to the office for post-operative evaluation  Her abdominal wound is nearly closed  No longer requires packing, and is covered with just a Band Aid  She has been afebrile  She denies nausea or vomiting  Her appetite is appropriate  She reports normal bowel and bladder function  She denies vaginal bleeding or discharge  She is without abdominal or pelvic pain  She is ambulatory          The following portions of the patient's history were reviewed and updated as appropriate: allergies, current medications, past family history, past medical history, past social history, past surgical history and problem list     Review of Systems   Constitutional: Negative for chills, fatigue, fever and unexpected weight change  HENT: Negative for nosebleeds  Eyes: Negative  Respiratory: Negative for cough, chest tightness, shortness of breath and wheezing  Cardiovascular: Negative for chest pain, palpitations and leg swelling  Gastrointestinal: Negative for abdominal distention, abdominal pain, anal bleeding, blood in stool, constipation, diarrhea, nausea, rectal pain and vomiting  Endocrine: Negative  Genitourinary: Negative for difficulty urinating, dysuria, frequency, hematuria, pelvic pain, urgency, vaginal bleeding, vaginal discharge and vaginal pain  Musculoskeletal: Negative for arthralgias and joint swelling  Skin: Positive for wound  Negative for color change, pallor and rash  Neurological: Negative for dizziness, weakness, light-headedness, numbness and headaches  Hematological: Negative  Psychiatric/Behavioral: Negative            Current Outpatient Medications:   •  acetaminophen (TYLENOL) 500 mg tablet, Take 2 tablets (1,000 mg total) by mouth every 6 (six) hours as needed for mild pain, Disp: 30 tablet, Rfl: 0  •  aspirin (ECOTRIN LOW STRENGTH) 81 mg EC tablet, Take 81 mg by mouth daily, Disp: , Rfl:   •  Cholecalciferol 125 MCG (5000 UT) capsule, Take by mouth, Disp: , Rfl:   •  co-enzyme Q-10 30 MG capsule, Take 30 mg by mouth 3 (three) times a day, Disp: , Rfl:   •  ibuprofen (MOTRIN) 200 mg tablet, Take 400 mg by mouth, Disp: , Rfl:   •  Lactobacillus (PROBIOTIC ACIDOPHILUS PO), Take 1 tablet by mouth, Disp: , Rfl:   •  levothyroxine 100 mcg tablet, Take 100 mcg by mouth daily, Disp: , Rfl:   •  liothyronine (CYTOMEL) 5 mcg tablet, Take 5 mcg by mouth daily, Disp: , Rfl:   •  metoprolol tartrate (LOPRESSOR) 50 mg tablet, Take 1 tablet (50 mg total) by mouth every 12 (twelve) hours, Disp: 60 tablet, Rfl: 0  •  vitamin B-12 (VITAMIN B-12) 1,000 mcg tablet, Take by mouth, Disp: , Rfl:   •  apixaban (Eliquis) 2 5 mg, Take 1 tablet PO BID x 28 days "post-operatively, Disp: 56 tablet, Rfl: 0  •  methocarbamol (ROBAXIN) 500 mg tablet, Take 1 tablet (500 mg total) by mouth 4 (four) times a day, Disp: 40 tablet, Rfl: 0    Objective:    Blood pressure 148/80, pulse 74, temperature 98 1 °F (36 7 °C), temperature source Temporal, height 5' 2\" (1 575 m), SpO2 98 %  Body mass index is 55 79 kg/m²  Body surface area is 2 28 meters squared  Physical Exam  Vitals reviewed  Exam conducted with a chaperone present  Constitutional:       General: She is not in acute distress  Appearance: Normal appearance  She is obese  HENT:      Head: Normocephalic and atraumatic  Mouth/Throat:      Mouth: Mucous membranes are moist    Abdominal:      Palpations: Abdomen is soft  There is no mass  Tenderness: There is no abdominal tenderness  Genitourinary:     Comments: The external female genitalia is normal  The bartholin's, uretheral and skenes glands are normal  The urethral meatus is normal (midline with no lesions)  Anus without fissure or lesion  Speculum exam reveals a grossly normal vagina  Vagina is intact, without dehiscense  No masses, lesions, discharge or bleeding  No significant cystocele or rectocele noted  Bimanual exam notes a surgical absent cervix, uterus and adnexal structures  No masses or fullness  Bladder is without fullness, mass or tenderness  Skin:     General: Skin is warm and dry  Comments: Midline incision well-healed with the exception of small defect in the lower half of the incision, covered by a bandaid after recent evaluation by general surgery at 1PM today  Neurological:      Mental Status: She is alert and oriented to person, place, and time  Psychiatric:         Mood and Affect: Mood normal          Behavior: Behavior normal          Thought Content:  Thought content normal          Judgment: Judgment normal                     "

## 2023-03-29 NOTE — PROGRESS NOTES
Office Visit - General Surgery  Cinthia Qureshi MRN: [id number]  Encounter: [id number]    Assessment and Plan  Problem List Items Addressed This Visit        Other    Status post repair of ventral hernia - Primary     The wound is pretty much healed except for a small open area  Asked to use either silver gel or an antibiotic ointment and a Band-Aid on this  Activity as tolerated  Her prescription for PT and OT for her deconditioning  Back here if needed  Chief Complaint:  Cinthia Qureshi is a 60 y o  female who presents for Follow-up (Wound check )    Subjective  60year old female status post incisional hernia repair at the time of her hysterectomy  She had problems with wound healing open wound that took some time to heal   Now has a small open area that she is doing dressing changes     Past Medical History:   Diagnosis Date   • Acute hypoxemic respiratory failure due to COVID-19 [facility])     trached-trachael damage-resection  damage to liver feeding tube   renal failure-dialysis   • History of renal failure     s/p sepsis and severe covid, was on dialysis   • History of transfusion     Pt unsure but thinks they gave her blood   • Primary hypertension        Past Surgical History:   Procedure Laterality Date   • CARPAL TUNNEL RELEASE     • EXPLORATORY LAPAROTOMY     • HYSTERECTOMY N/A 1/27/2023    Procedure: HYSTERECTOMY TOTAL ABDOMINAL (HILTON), BILATERAL SALPINGO-OOPHORECTOMY, WITH EXTENSIVE LYSIS OF ADHESIONS;  Surgeon: Rochelle Singh MD;  [facility];  Service: Gynecology Oncology   • [address] N/A 1/27/2023    Procedure: Hugh Minium WITH PHASIX MESH;  Surgeon: Joshua Linares MD;  [facility];  Service: General   • PEG TUBE PLACEMENT      and removed   • [place] HYSTEROSCOPY BX ENDOMETRIUM&/POLYPC W/WO D&C N/A 10/17/2022    Procedure: DILATATION AND CURETTAGE (D&C) WITH HYSTEROSCOPY;  Surgeon: Rochelle Singh MD;  [facility]; Service: Gynecology Oncology   • [place] MUSC MYOCUTANEOUS/FASCIOCUTANEOUS FLAP TRUNK N/A 2023    Procedure: COMPONENT SEPARATION;  Surgeon: Sarahi Morales MD;  [facility];  Service: General   • REVISION TRACHEOSTOMY SCAR     • TRACHEOSTOMY     • TUMOR REMOVAL Left     upper arm at age 11, benign       Family History   Problem Relation Age of Onset   • Cancer Mother    • Cancer Father        Social History     Tobacco Use   • Smoking status: Former     Packs/day: 0 50     Years: 10 00     Pack years: 5 00     Types: Cigarettes     Quit date: 80     Years since quittin 2   • Smokeless tobacco: Never   Vaping Use   • Vaping Use: Never used   Substance Use Topics   • Alcohol use: Yes     Comment: 1 x month   • Drug use: Never        Medications  Current Outpatient Medications on File Prior to Visit   Medication Sig Dispense Refill   • acetaminophen (TYLENOL) 500 mg tablet Take 2 tablets (1,000 mg total) by mouth every 6 (six) hours as needed for mild pain 30 tablet 0   • aspirin (ECOTRIN LOW STRENGTH) 81 mg EC tablet Take 81 mg by mouth daily     • Cholecalciferol 125 MCG (5000 UT) capsule Take by mouth     • co-enzyme Q-10 30 MG capsule Take 30 mg by mouth 3 (three) times a day     • levothyroxine 100 mcg tablet Take 100 mcg by mouth daily     • liothyronine (CYTOMEL) 5 mcg tablet Take 5 mcg by mouth daily     • vitamin B-12 (VITAMIN B-12) 1,000 mcg tablet Take by mouth     • apixaban (Eliquis) 2 5 mg Take 1 tablet PO BID x 28 days post-operatively 56 tablet 0   • ibuprofen (MOTRIN) 200 mg tablet Take 400 mg by mouth     • Lactobacillus (PROBIOTIC ACIDOPHILUS PO) Take 1 tablet by mouth     • methocarbamol (ROBAXIN) 500 mg tablet Take 1 tablet (500 mg total) by mouth 4 (four) times a day 40 tablet 0   • metoprolol tartrate (LOPRESSOR) 50 mg tablet Take 1 tablet (50 mg total) by mouth every 12 (twelve) hours 60 tablet 0     No current facility-administered medications on file prior to visit  Allergies  No Known Allergies    Review of Systems    Objective  Vitals:    03/29/23 1303   Temp: (!) 96 4 °F (35 8 °C)       Physical Exam   Abdomen: Long midline incision healing well except a small area over third of the wound this is about 15 55 2 with pink tissue at the base, periwound is clean

## 2023-03-29 NOTE — ASSESSMENT & PLAN NOTE
The wound is pretty much healed except for a small open area  Asked to use either silver gel or an antibiotic ointment and a Band-Aid on this  Activity as tolerated  Her prescription for PT and OT for her deconditioning  Back here if needed

## 2023-03-29 NOTE — ASSESSMENT & PLAN NOTE
69-year-old who is status post HILTON, BSO, extensive lysis of adhesions, and hernia repair with mesh on January 27, 2023  Final pathology was benign  Her visiting nurse called on 2/17/23 to report a dehiscence of her midline incision below the umbilicus  She has been under the care of Dr Sanjana Salmeron for this and has VNA managing her wound at home  She saw Dr Sanjana Salmeron earlier today who noted that the wound is almost completely healed, with just a small defect  It is covered with a Band Aid  Otherwise, she has no new concerns and has recovered well from surgery  Her vaginal cuff is well-healed  Her PS is 1  Patient will resume routine gyn care with her primary gynecologist  She will call us back with bleeding or other new concerns  She may resume normal activity

## 2023-03-29 NOTE — PATIENT INSTRUCTIONS
1  Return to primary gynecologist for routine gynecologic care    2  Call the office if you develop any new or concerning symptoms related to your surgery (new pain, bleeding, discharge, fevers, etc )

## 2023-04-04 ENCOUNTER — TELEPHONE (OUTPATIENT)
Dept: SURGERY | Facility: CLINIC | Age: 58
End: 2023-04-04

## 2023-04-04 NOTE — TELEPHONE ENCOUNTER
Mailed all records to her :  [address] [facility], [address] [address]  Attn:  Wilfredo gregory

## 2023-06-29 ENCOUNTER — TELEPHONE (OUTPATIENT)
Dept: HEMATOLOGY ONCOLOGY | Facility: CLINIC | Age: 58
End: 2023-06-29

## 2023-06-29 NOTE — TELEPHONE ENCOUNTER
Appointment Change  Cancel, Reschedule, Change to Virtual      Who are you speaking with? Patient   If it is not the patient, are they listed on an active communication consent form? N/A   Which provider is the appointment scheduled with? Dr Lisa Tolentino   When is the appointment scheduled? Please list date and time  2/22/23 @ 12noon   At which location is the appointment scheduled to take place? [place]   Was the appointment rescheduled or changed from an in person visit to a virtual visit? If so, please list the details of the change  Yes 8/8/23 @ 11:30am   What is the reason for the appointment change? Patient had complications from a surgery and needed to reschedule with Dr Lisa Tolentino   Was [facility] transport scheduled for this visit? no   Does [facility] transport need to be scheduled for the new visit (if applicable) no   Does the patient need an infusion appointment rescheduled? no   Does the patient have an infusion appointment scheduled? If so, when? no   Is the patient undergoing chemotherapy? no   Was the no-show policy reviewed for appointments being changed with less then 24 hours of notice?  yes

## 2023-08-08 ENCOUNTER — OFFICE VISIT (OUTPATIENT)
Dept: CARDIAC SURGERY | Facility: CLINIC | Age: 58
End: 2023-08-08
Payer: MEDICARE

## 2023-08-08 VITALS
SYSTOLIC BLOOD PRESSURE: 158 MMHG | OXYGEN SATURATION: 95 % | DIASTOLIC BLOOD PRESSURE: 82 MMHG | TEMPERATURE: 97.5 F | HEIGHT: 62 IN | RESPIRATION RATE: 15 BRPM | HEART RATE: 83 BPM | BODY MASS INDEX: 55.79 KG/M2

## 2023-08-08 DIAGNOSIS — J95.03 TRACHEAL STENOSIS DUE TO TRACHEOSTOMY (HCC): Primary | ICD-10-CM

## 2023-08-08 PROCEDURE — 99205 OFFICE O/P NEW HI 60 MIN: CPT | Performed by: THORACIC SURGERY (CARDIOTHORACIC VASCULAR SURGERY)

## 2023-08-08 RX ORDER — DOXEPIN HYDROCHLORIDE 10 MG/1
CAPSULE ORAL
COMMUNITY
Start: 2023-05-02

## 2023-08-08 NOTE — ASSESSMENT & PLAN NOTE
Ms. 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 This developed after tracheostomy in 2020, and last year she underwent bronchoscopy with debridement and balloon dilation. She is complaining of dyspnea on exertion as well as orthopnea. These things may be related to her tracheal stenosis, though her elevated BMI can be contributing. Because she is not in extremis at rest or with activity, there is no urgency to a tracheal resection. She feels that her quality of life is affected significantly and would like to discuss surgery. This surgery was discussed at length. Dr. Ella Lopez requires a CT neck and chest as well as a bronchoscopy done by himself prior to any surgery. She would like to take some time to think about this and would prefer to have this done in December.  We discussed the importance of weight loss in that these symptoms may be multifactorial.

## 2023-08-08 NOTE — PROGRESS NOTES
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 Stefani Fairchild presents for evaluation of tracheal stenosis and dyspnea on exertion. This developed after tracheostomy in 2020, and last year she underwent bronchoscopy with debridement and balloon dilation. She is complaining of dyspnea on exertion as well as orthopnea. These things may be related to her tracheal stenosis, though her elevated BMI can be contributing. Because she is not in extremis at rest or with activity, there is no urgency to a tracheal resection. She feels that her quality of life is affected significantly and would like to discuss surgery. This surgery was discussed at length. Dr. Leopoldo Lulas requires a CT neck and chest as well as a bronchoscopy done by himself prior to any surgery. She would like to take some time to think about this and would prefer to have this done in December. We discussed the importance of weight loss in that these symptoms may be multifactorial.        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 female. HPI   Ms. Stefani Fairchild is a 60year old female referred to our office for evaluation of tracheal stenosis after tracheostomy 2020 due to COVID-19 respiratory insufficiency. In February 2022, she had undergone a bronchoscopy with debridement and balloon dilation of her stenotic area at [facility]. She had a discussion with thoracic surgery there regarding tracheal resection. She is seeking a second opinion. On discussion, she reports shortness of breath on exertion with little activity, like carrying laundry, walking some short distances, and overhead activities.  She can hear stridor occasionally when she walks and talks. Her biggest complaint is laying flat, she can't do this. She has frequent throat clearing as well. After talking a while, she reports voice hoarseness. She is working with PT. The following portions of the patient's history were reviewed and updated as appropriate: allergies, current medications, past family history, past medical history, past social history, past surgical history and problem list.    Past Medical History:   Diagnosis Date   • Acute hypoxemic respiratory failure due to COVID-19 [facility])     trached-trachael damage-resection. damage to liver feeding tube.  renal failure-dialysis   • History of renal failure     s/p sepsis and severe covid, was on dialysis   • History of transfusion     Pt unsure but thinks they gave her blood   • Primary hypertension       Past Surgical History:   Procedure Laterality Date   • CARPAL TUNNEL RELEASE     • EXPLORATORY LAPAROTOMY     • HYSTERECTOMY N/A 1/27/2023    Procedure: HYSTERECTOMY TOTAL ABDOMINAL (HILTON), BILATERAL SALPINGO-OOPHORECTOMY, WITH EXTENSIVE LYSIS OF ADHESIONS;  Surgeon: Carol Ramos MD;  Location: [facility];  Service: Gynecology Oncology   • [address] N/A 1/27/2023    Procedure: Kya Man WITH PHASIX MESH;  Surgeon: Irma Joshi MD;  Location: [facility];  Service: General   • PEG TUBE PLACEMENT      and removed   • CO HYSTEROSCOPY BX ENDOMETRIUM&/POLYPC W/WO D&C N/A 10/17/2022    Procedure: DILATATION AND CURETTAGE (D&C) WITH HYSTEROSCOPY;  Surgeon: Carol Ramos MD;  Location: [facility];  Service: Gynecology Oncology   • CO MUSC MYOCUTANEOUS/FASCIOCUTANEOUS FLAP TRUNK N/A 1/27/2023    Procedure: COMPONENT SEPARATION;  Surgeon: Irma Joshi MD;  Location: [facility];  Service: General   • REVISION TRACHEOSTOMY SCAR     • TRACHEOSTOMY     • TUMOR REMOVAL Left     upper arm at age 11, benign      Family History   Problem Relation Age of Onset • Cancer Mother    • Cancer Father       Social History     Socioeconomic History   • Marital status: Single     Spouse name: Not on file   • Number of children: Not on file   • Years of education: Not on file   • Highest education level: Not on file   Occupational History   • Not on file   Tobacco Use   • Smoking status: Former     Packs/day: 0.50     Years: 10.00     Total pack years: 5.00     Types: Cigarettes     Quit date: 80     Years since quittin.6   • Smokeless tobacco: Never   Vaping Use   • Vaping Use: Never used   Substance and Sexual Activity   • Alcohol use: Yes     Comment: 1 x month   • Drug use: Never   • Sexual activity: Not on file   Other Topics Concern   • Not on file   Social History Narrative   • Not on file     Social Determinants of Health     Financial Resource Strain: Not on file   Food Insecurity: Not on file   Transportation Needs: Not on file   Physical Activity: Not on file   Stress: Not on file   Social Connections: Not on file   Intimate Partner Violence: Not on file   Housing Stability: Not on file      Current Outpatient Medications   Medication Instructions   • acetaminophen (TYLENOL) 1,000 mg, Oral, Every 6 hours PRN   • apixaban (Eliquis) 2.5 mg Take 1 tablet PO BID x 28 days post-operatively   • aspirin (ECOTRIN LOW STRENGTH) 81 mg, Oral, Daily   • Cholecalciferol 125 MCG (5000 UT) capsule Oral   • co-enzyme Q-10 30 mg, Oral, 3 times daily   • doxepin (SINEquan) 10 mg capsule No dose, route, or frequency recorded.    • ibuprofen (MOTRIN) 400 mg, Oral   • Lactobacillus (PROBIOTIC ACIDOPHILUS PO) 1 tablet, Oral   • levothyroxine 100 mcg, Oral, Daily   • liothyronine (CYTOMEL) 5 mcg, Oral, Daily   • methocarbamol (ROBAXIN) 500 mg, Oral, 4 times daily   • metoprolol tartrate (LOPRESSOR) 50 mg, Oral, Every 12 hours scheduled   • vitamin B-12 (VITAMIN B-12) 1,000 mcg tablet Oral     No Known Allergies    Review of Systems   Respiratory: Positive for cough and shortness of breath. Neurological: Positive for weakness (improving). All other systems reviewed and are negative. Objective:   Physical Exam  Constitutional:       General: She is not in acute distress. Appearance: She is obese. Comments: BMI 55.79   HENT:      Head: Normocephalic and atraumatic. Eyes:      Conjunctiva/sclera: Conjunctivae normal.   Cardiovascular:      Rate and Rhythm: Normal rate and regular rhythm. Pulmonary:      Effort: Pulmonary effort is normal. No respiratory distress. Breath sounds: Normal breath sounds. Abdominal:      General: There is no distension. Palpations: Abdomen is soft. Musculoskeletal:      Cervical back: Neck supple. Right lower leg: No edema. Left lower leg: No edema. Lymphadenopathy:      Cervical: No cervical adenopathy. Skin:     General: Skin is warm and dry. Neurological:      General: No focal deficit present. Mental Status: She is alert and oriented to person, place, and time.    Psychiatric:         Mood and Affect: Mood normal.     /82 (BP Location: Left arm, Patient Position: Sitting, Cuff Size: Large)   Pulse 83   Temp 97.5 °F (36.4 °C) (Temporal)   Resp 15   Ht 5' 2" (1.575 m)   LMP  (LMP Unknown)   SpO2 95%   BMI 55.79 kg/m²

## 2023-09-07 ENCOUNTER — TELEPHONE (OUTPATIENT)
Dept: CARDIAC SURGERY | Facility: CLINIC | Age: 58
End: 2023-09-07

## 2023-09-07 NOTE — TELEPHONE ENCOUNTER
ANITHAM to please call back to schedule CT scan of the neck and chest prior to her appt with Dr. Devon Marino on 11/14

## 2023-09-07 NOTE — TELEPHONE ENCOUNTER
Pt will be having her neck/chest CT done at an outside facility. She does not have this appt scheduled yet but will let us know once she does. Pt will have imaging done prior to her appt with us on 11/14. She will make sure [facility] in [place], [place] sends us the imagine and the report once test is completed.

## 2023-10-03 ENCOUNTER — TELEPHONE (OUTPATIENT)
Dept: CARDIAC SURGERY | Facility: CLINIC | Age: 58
End: 2023-10-03

## 2023-10-03 NOTE — TELEPHONE ENCOUNTER
Pt called in to cancel her follow up and held surgery date. She is not sure if she wants to move forward at this time, she may go for a second opinion. She will call us back if she decides she wants to move forward with surgery. Message sent to provider.

## 2024-11-26 ENCOUNTER — NURSE TRIAGE (OUTPATIENT)
Age: 59
End: 2024-11-26

## 2024-11-26 ENCOUNTER — TELEPHONE (OUTPATIENT)
Age: 59
End: 2024-11-26

## 2024-11-26 NOTE — TELEPHONE ENCOUNTER
Patient called in with concerns about the mesh from her hernia surgery last year with becca Bello tx to [facility] for further assistance.

## 2024-11-26 NOTE — TELEPHONE ENCOUNTER
"PT warm transferred from [place] to this CTS-RN. PT s/p Procedures:      HYSTERECTOMY TOTAL ABDOMINAL (HILTON), BILATERAL SALPINGO-OOPHORECTOMY, WITH EXTENSIVE LYSIS OF ADHESIONS (Abdomen)      REPAIR HERNIA INCISIONAL WITH PHASIX MESH (Abdomen)      COMPONENT SEPARATION (Abdomen) Jan. 2023 with [facility].     PT reports yesterday having [employer] coffee for the first time in a while. States, \"it ran right through me like how it used to.\" PT then \"felt fine\" and had flounder for dinner. Around 10pm, PT noted that she did not have enough protein and water throughout the day, and had x2 sugar-free Atkins cookies. PT went to bed around midnight; PT always sleeps elevated. Around 3am, PT awoke and felt nauseous. PT tried to make self vomit WO success. PT reports continued nausea and unsuccessful vomiting attempts throughout morning. PT denies any sick contacts, fevers/chills.     Reports having mild discomfort at top of stomach that is in the same area as prior hernia repair. PT concerned that hernia has returned or mesh has been displaced. PT denies any new lumps. PT reports \"feeling fine;\" PT well-sounding on phone speaking in clear, completed sentences.     PT on ozempic 1mg for a few months prescribed by PCP. PT denies any issues while on ozempic. PT reached out to PCP and awaiting call back/recommendations.     PT requesting Dr. Richards's recommendations.  Next available appointment 12/17/24. Reassurance provided to PT.    Call back: [phone]    Reason for Disposition   Nursing judgment    Answer Assessment - Initial Assessment Questions  1. REASON FOR CALL: \"What is your main concern right now?\"      nausea  2. ONSET: \"When did the sxs start?\"      3am  3. SEVERITY: \"How bad is the sxs?\"      moderate  4. FEVER: \"Do you have a fever?\"      denies  5. OTHER SYMPTOMS: \"Do you have any other new symptoms?\"      denies  6. INTERVENTIONS AND RESPONSE: \"What have you done so far to try to make this " "better? What medications have you used?\"      Tried to make self vomit  7. PREGNANCY: \"Is there any chance you are pregnant?\"      N/a    Protocols used: No Protocol Available - Sick Adult-ADULT-[place]    "

## 2024-11-27 ENCOUNTER — TELEPHONE (OUTPATIENT)
Dept: SURGERY | Facility: CLINIC | Age: 59
End: 2024-11-27

## 2024-11-27 NOTE — TELEPHONE ENCOUNTER
Call to Erika to let her know that it is too long to know if there is a recurrent hernia or something wrong with mesh. If she does not feel a lump he can't tell. We can offer her an appointment to come in and see Dr. Corona.

## 2024-12-18 NOTE — TELEPHONE ENCOUNTER
MRI NURSING NOTE:   No response received from Cherry GUERRA APRN.    Patient decided on canceling her upcoming surgery and will now schedule for a dual surgery here at [facility]  Patient can be reached back @ [phone] for the next step in scheduling

## (undated) DEVICE — BETHLEHEM UNIVERSAL MINOR VAG: Brand: CARDINAL HEALTH

## (undated) DEVICE — INSUFFLATION TUBING PRIMFLO

## (undated) DEVICE — DRAPE TOWEL: Brand: CONVERTORS

## (undated) DEVICE — SUT VICRYL 2-0 REEL 54 IN J286G

## (undated) DEVICE — ELECTRODE BLADE MOD  E-Z CLEAN 6.5IN -0014M

## (undated) DEVICE — SUT ETHILON 3-0 FS-1 18 IN 663G

## (undated) DEVICE — GAUZE SPONGES,16 PLY: Brand: CURITY

## (undated) DEVICE — DRAIN SPONGES,6 PLY: Brand: EXCILON

## (undated) DEVICE — PACK PBDS STERILE LAP LITHOTOMY RF

## (undated) DEVICE — JACKSON-PRATT 100CC BULB RESERVOIR: Brand: CARDINAL HEALTH

## (undated) DEVICE — GLOVE PI ULTRA TOUCH SZ.7.5

## (undated) DEVICE — ADHESIVE SKIN HIGH VISCOSITY EXOFIN 1ML

## (undated) DEVICE — 3M™ TEGADERM™ TRANSPARENT FILM DRESSING FRAME STYLE, 1628, 6 IN X 8 IN (15 CM X 20 CM), 10/CT 8CT/CASE: Brand: 3M™ TEGADERM™

## (undated) DEVICE — GLOVE INDICATOR PI UNDERGLOVE SZ 8 BLUE

## (undated) DEVICE — SUT PDS PLUS 1 CTX 36IN PDP371T

## (undated) DEVICE — DRAPE SHEET THREE QUARTER

## (undated) DEVICE — CHLORHEXIDINE 4PCT 4 OZ

## (undated) DEVICE — BINDER ABDOMINAL 63-74 IN

## (undated) DEVICE — PLUMEPEN PRO 10FT

## (undated) DEVICE — ANTIBACTERIAL UNDYED BRAIDED (POLYGLACTIN 910), SYNTHETIC ABSORBABLE SUTURE: Brand: COATED VICRYL

## (undated) DEVICE — TUBING SUCTION 5MM X 12 FT

## (undated) DEVICE — GLOVE SRG BIOGEL ORTHOPEDIC 7.5

## (undated) DEVICE — CHLORAPREP HI-LITE 26ML ORANGE

## (undated) DEVICE — SUT VICRYL 2-0 CT-1 27 IN J259H

## (undated) DEVICE — PREMIUM DRY TRAY LF: Brand: MEDLINE INDUSTRIES, INC.

## (undated) DEVICE — PROXIMATE SKIN STAPLERS (35 WIDE) CONTAINS 35 STAINLESS STEEL STAPLES (FIXED HEAD): Brand: PROXIMATE

## (undated) DEVICE — PENCIL ELECTROSURG E-Z CLEAN -0035H

## (undated) DEVICE — BULB SYRINGE,IRRIGATION WITH PROTECTIVE CAP: Brand: DOVER

## (undated) DEVICE — 1820 FOAM BLOCK NEEDLE COUNTER: Brand: DEVON

## (undated) DEVICE — SUT VICRYL 0 REEL 54 IN J287G

## (undated) DEVICE — SUT VICRYL 0 CT-1 CR/8 27 IN JJ41G

## (undated) DEVICE — TISSUE REMOVAL SYSTEM FLUID MANAGEMENT ACCESSORIES: Brand: SYMPHION

## (undated) DEVICE — MEDI-VAC YANK SUCT HNDL W/TPRD BULBOUS TIP: Brand: CARDINAL HEALTH

## (undated) DEVICE — Device: Brand: OMNICLOSE TROCAR SITE CLOSURE DEVICE

## (undated) DEVICE — JP PERF DRN SIL FLT 10MM FULL: Brand: CARDINAL HEALTH

## (undated) DEVICE — 3M™ PRECISE™ MULTI-SHOT DS DISPOSABLE SKIN STAPLER, DS-5: Brand: 3M™ PRECISE™

## (undated) DEVICE — SYRINGE CATH TIP 50ML

## (undated) DEVICE — TRAY FOLEY 16FR URIMETER SILICONE SURESTEP

## (undated) DEVICE — NEEDLE 25G X 1 1/2

## (undated) DEVICE — SUT VICRYL 3-0 SH 27 IN J416H

## (undated) DEVICE — SPONGE LAP 18 X 18 IN STRL RFD

## (undated) DEVICE — SUT PDS II 0 CT-1 27 IN Z340H